# Patient Record
Sex: FEMALE | Race: WHITE | NOT HISPANIC OR LATINO | Employment: OTHER | URBAN - METROPOLITAN AREA
[De-identification: names, ages, dates, MRNs, and addresses within clinical notes are randomized per-mention and may not be internally consistent; named-entity substitution may affect disease eponyms.]

---

## 2021-03-27 ENCOUNTER — TRANSFERRED RECORDS (OUTPATIENT)
Dept: HEALTH INFORMATION MANAGEMENT | Facility: CLINIC | Age: 71
End: 2021-03-27

## 2021-03-28 ENCOUNTER — MEDICAL CORRESPONDENCE (OUTPATIENT)
Dept: HEALTH INFORMATION MANAGEMENT | Facility: CLINIC | Age: 71
End: 2021-03-28

## 2021-04-07 ENCOUNTER — TRANSFERRED RECORDS (OUTPATIENT)
Dept: HEALTH INFORMATION MANAGEMENT | Facility: CLINIC | Age: 71
End: 2021-04-07

## 2021-05-05 ENCOUNTER — RECORDS - HEALTHEAST (OUTPATIENT)
Dept: ADMINISTRATIVE | Facility: OTHER | Age: 71
End: 2021-05-05

## 2021-05-05 DIAGNOSIS — M26.643 ARTHRITIS OF BILATERAL TEMPOROMANDIBULAR JOINT: ICD-10-CM

## 2021-10-21 DIAGNOSIS — Z11.59 ENCOUNTER FOR SCREENING FOR OTHER VIRAL DISEASES: ICD-10-CM

## 2021-10-25 ENCOUNTER — TRANSFERRED RECORDS (OUTPATIENT)
Dept: MULTI SPECIALTY CLINIC | Facility: CLINIC | Age: 71
End: 2021-10-25

## 2021-10-25 LAB — POTASSIUM (EXTERNAL): 4.3 MMOL/L (ref 3.5–5)

## 2021-10-25 RX ORDER — AMLODIPINE BESYLATE 5 MG/1
5 TABLET ORAL DAILY
COMMUNITY

## 2021-10-25 RX ORDER — SOTALOL HYDROCHLORIDE 80 MG/1
80 TABLET ORAL 2 TIMES DAILY
COMMUNITY

## 2021-10-25 RX ORDER — DULOXETIN HYDROCHLORIDE 60 MG/1
60 CAPSULE, DELAYED RELEASE ORAL EVERY EVENING
COMMUNITY

## 2021-10-25 RX ORDER — DULOXETIN HYDROCHLORIDE 30 MG/1
30 CAPSULE, DELAYED RELEASE ORAL DAILY
COMMUNITY

## 2021-10-25 RX ORDER — LOSARTAN POTASSIUM 100 MG/1
100 TABLET ORAL DAILY
COMMUNITY

## 2021-10-25 RX ORDER — TRAMADOL HYDROCHLORIDE 50 MG/1
50-100 TABLET ORAL EVERY 4 HOURS PRN
Status: ON HOLD | COMMUNITY
End: 2021-11-05

## 2021-10-25 RX ORDER — NORTRIPTYLINE HCL 10 MG
10 CAPSULE ORAL AT BEDTIME
COMMUNITY

## 2021-10-25 RX ORDER — ATORVASTATIN CALCIUM 40 MG/1
40 TABLET, FILM COATED ORAL AT BEDTIME
COMMUNITY

## 2021-10-25 RX ORDER — ALBUTEROL SULFATE 90 UG/1
1-2 AEROSOL, METERED RESPIRATORY (INHALATION) EVERY 4 HOURS PRN
COMMUNITY

## 2021-10-25 ASSESSMENT — MIFFLIN-ST. JEOR: SCORE: 1449.82

## 2021-10-26 NOTE — PROVIDER NOTIFICATION
Discharge plan according to [facility]:       10/25/21 1432   Discharge Planning   Patient/Family Anticipates Transition to home   Living Arrangements   People in home alone   Type of Residence Private Residence   Is your private residence a single family home or apartment? Single family home   Bathroom Shower/Tub Tub/Shower unit   Support System   Support Systems Children

## 2021-11-03 ENCOUNTER — ANCILLARY PROCEDURE (OUTPATIENT)
Dept: ULTRASOUND IMAGING | Facility: CLINIC | Age: 71
End: 2021-11-03
Attending: ANESTHESIOLOGY
Payer: COMMERCIAL

## 2021-11-03 ENCOUNTER — HOSPITAL ENCOUNTER (OUTPATIENT)
Facility: CLINIC | Age: 71
Discharge: NURSING, REHAB OR LONG-TERM CARE | End: 2021-11-07
Attending: ORTHOPAEDIC SURGERY | Admitting: ORTHOPAEDIC SURGERY
Payer: COMMERCIAL

## 2021-11-03 ENCOUNTER — APPOINTMENT (OUTPATIENT)
Dept: RADIOLOGY | Facility: CLINIC | Age: 71
End: 2021-11-03
Attending: ORTHOPAEDIC SURGERY
Payer: COMMERCIAL

## 2021-11-03 ENCOUNTER — APPOINTMENT (OUTPATIENT)
Dept: RADIOLOGY | Facility: CLINIC | Age: 71
End: 2021-11-03
Attending: PHYSICIAN ASSISTANT
Payer: COMMERCIAL

## 2021-11-03 ENCOUNTER — ANESTHESIA (OUTPATIENT)
Dept: SURGERY | Facility: CLINIC | Age: 71
End: 2021-11-03
Payer: COMMERCIAL

## 2021-11-03 ENCOUNTER — ANESTHESIA EVENT (OUTPATIENT)
Dept: SURGERY | Facility: CLINIC | Age: 71
End: 2021-11-03
Payer: COMMERCIAL

## 2021-11-03 DIAGNOSIS — Z96.641 STATUS POST TOTAL REPLACEMENT OF RIGHT HIP: Primary | ICD-10-CM

## 2021-11-03 PROBLEM — Z96.649 S/P TOTAL HIP ARTHROPLASTY: Status: ACTIVE | Noted: 2021-11-03

## 2021-11-03 LAB
ABO/RH(D): NORMAL
ANTIBODY SCREEN: NEGATIVE
CREAT SERPL-MCNC: 0.74 MG/DL (ref 0.6–1.1)
GFR SERPL CREATININE-BSD FRML MDRD: 81 ML/MIN/1.73M2
GLUCOSE BLDC GLUCOMTR-MCNC: 151 MG/DL (ref 70–99)
GLUCOSE BLDC GLUCOMTR-MCNC: 190 MG/DL (ref 70–99)
GLUCOSE BLDC GLUCOMTR-MCNC: 207 MG/DL (ref 70–99)
HGB BLD-MCNC: 13.4 G/DL (ref 11.7–15.7)
INR PPP: 0.93 (ref 0.85–1.15)
SPECIMEN EXPIRATION DATE: NORMAL

## 2021-11-03 PROCEDURE — 999N000182 XR SURGERY CARM FLUORO GREATER THAN 5 MIN: Mod: TC

## 2021-11-03 PROCEDURE — 258N000001 HC RX 258: Performed by: ORTHOPAEDIC SURGERY

## 2021-11-03 PROCEDURE — 36415 COLL VENOUS BLD VENIPUNCTURE: CPT | Performed by: PHYSICIAN ASSISTANT

## 2021-11-03 PROCEDURE — 258N000003 HC RX IP 258 OP 636: Performed by: ANESTHESIOLOGY

## 2021-11-03 PROCEDURE — 250N000011 HC RX IP 250 OP 636: Performed by: ANESTHESIOLOGY

## 2021-11-03 PROCEDURE — 250N000011 HC RX IP 250 OP 636: Performed by: PHYSICIAN ASSISTANT

## 2021-11-03 PROCEDURE — 370N000017 HC ANESTHESIA TECHNICAL FEE, PER MIN: Performed by: ORTHOPAEDIC SURGERY

## 2021-11-03 PROCEDURE — 999N000065 XR PELVIS AND HIP RIGHT 2 VIEWS

## 2021-11-03 PROCEDURE — 250N000025 HC SEVOFLURANE, PER MIN: Performed by: ORTHOPAEDIC SURGERY

## 2021-11-03 PROCEDURE — 250N000009 HC RX 250: Performed by: NURSE ANESTHETIST, CERTIFIED REGISTERED

## 2021-11-03 PROCEDURE — 85610 PROTHROMBIN TIME: CPT | Performed by: PHYSICIAN ASSISTANT

## 2021-11-03 PROCEDURE — 250N000011 HC RX IP 250 OP 636: Performed by: NURSE ANESTHETIST, CERTIFIED REGISTERED

## 2021-11-03 PROCEDURE — 250N000013 HC RX MED GY IP 250 OP 250 PS 637: Mod: GY | Performed by: INTERNAL MEDICINE

## 2021-11-03 PROCEDURE — C1713 ANCHOR/SCREW BN/BN,TIS/BN: HCPCS | Performed by: ORTHOPAEDIC SURGERY

## 2021-11-03 PROCEDURE — 360N000084 HC SURGERY LEVEL 4 W/ FLUORO, PER MIN: Performed by: ORTHOPAEDIC SURGERY

## 2021-11-03 PROCEDURE — 250N000012 HC RX MED GY IP 250 OP 636 PS 637: Performed by: INTERNAL MEDICINE

## 2021-11-03 PROCEDURE — 82565 ASSAY OF CREATININE: CPT | Performed by: ORTHOPAEDIC SURGERY

## 2021-11-03 PROCEDURE — 250N000011 HC RX IP 250 OP 636: Performed by: ORTHOPAEDIC SURGERY

## 2021-11-03 PROCEDURE — 710N000010 HC RECOVERY PHASE 1, LEVEL 2, PER MIN: Performed by: ORTHOPAEDIC SURGERY

## 2021-11-03 PROCEDURE — 85018 HEMOGLOBIN: CPT | Performed by: PHYSICIAN ASSISTANT

## 2021-11-03 PROCEDURE — 250N000009 HC RX 250: Performed by: PHYSICIAN ASSISTANT

## 2021-11-03 PROCEDURE — 86900 BLOOD TYPING SEROLOGIC ABO: CPT | Performed by: PHYSICIAN ASSISTANT

## 2021-11-03 PROCEDURE — 250N000013 HC RX MED GY IP 250 OP 250 PS 637: Performed by: PHYSICIAN ASSISTANT

## 2021-11-03 PROCEDURE — C1776 JOINT DEVICE (IMPLANTABLE): HCPCS | Performed by: ORTHOPAEDIC SURGERY

## 2021-11-03 PROCEDURE — 73552 X-RAY EXAM OF FEMUR 2/>: CPT | Mod: RT

## 2021-11-03 PROCEDURE — 272N000001 HC OR GENERAL SUPPLY STERILE: Performed by: ORTHOPAEDIC SURGERY

## 2021-11-03 PROCEDURE — 258N000003 HC RX IP 258 OP 636: Performed by: ORTHOPAEDIC SURGERY

## 2021-11-03 PROCEDURE — 999N000141 HC STATISTIC PRE-PROCEDURE NURSING ASSESSMENT: Performed by: ORTHOPAEDIC SURGERY

## 2021-11-03 PROCEDURE — 82962 GLUCOSE BLOOD TEST: CPT | Mod: 91

## 2021-11-03 PROCEDURE — 96372 THER/PROPH/DIAG INJ SC/IM: CPT | Performed by: INTERNAL MEDICINE

## 2021-11-03 PROCEDURE — 99223 1ST HOSP IP/OBS HIGH 75: CPT | Performed by: INTERNAL MEDICINE

## 2021-11-03 PROCEDURE — 36415 COLL VENOUS BLD VENIPUNCTURE: CPT | Performed by: ORTHOPAEDIC SURGERY

## 2021-11-03 PROCEDURE — 258N000003 HC RX IP 258 OP 636: Performed by: PHYSICIAN ASSISTANT

## 2021-11-03 DEVICE — SHELL PINNACLE SECTOR W/ GRIPTION 52MM: Type: IMPLANTABLE DEVICE | Site: HIP | Status: FUNCTIONAL

## 2021-11-03 DEVICE — IMP CABLE SYN ORTHO COCR W/CRIMP 1.7X750MM 611.105.01S: Type: IMPLANTABLE DEVICE | Site: HIP | Status: FUNCTIONAL

## 2021-11-03 DEVICE — BIOLOX DELTA CERAMIC FEMORAL HEAD +8.5 36MM DIA 12/14 TAPER
Type: IMPLANTABLE DEVICE | Site: HIP | Status: FUNCTIONAL
Brand: BIOLOX DELTA

## 2021-11-03 DEVICE — APEX HOLE ELIMINATOR - PS
Type: IMPLANTABLE DEVICE | Site: HIP | Status: FUNCTIONAL
Brand: APEX

## 2021-11-03 DEVICE — PINNACLE HIP SOLUTIONS ALTRX POLYETHYLENE ACETABULAR LINER +4 NEUTRAL 36MM ID 52MM OD
Type: IMPLANTABLE DEVICE | Site: HIP | Status: FUNCTIONAL
Brand: PINNACLE ALTRX

## 2021-11-03 DEVICE — ACTIS DUOFIX HIP PROSTHESIS (FEMORAL STEM 12/14 TAPER CEMENTLESS SIZE 3 HIGH COLLAR)  CE
Type: IMPLANTABLE DEVICE | Site: HIP | Status: FUNCTIONAL
Brand: ACTIS

## 2021-11-03 RX ORDER — FENTANYL CITRATE 50 UG/ML
25 INJECTION, SOLUTION INTRAMUSCULAR; INTRAVENOUS EVERY 5 MIN PRN
Status: DISCONTINUED | OUTPATIENT
Start: 2021-11-03 | End: 2021-11-03 | Stop reason: HOSPADM

## 2021-11-03 RX ORDER — DULOXETIN HYDROCHLORIDE 30 MG/1
30 CAPSULE, DELAYED RELEASE ORAL DAILY
Status: DISCONTINUED | OUTPATIENT
Start: 2021-11-04 | End: 2021-11-07 | Stop reason: HOSPADM

## 2021-11-03 RX ORDER — LIDOCAINE HYDROCHLORIDE 10 MG/ML
INJECTION, SOLUTION INFILTRATION; PERINEURAL PRN
Status: DISCONTINUED | OUTPATIENT
Start: 2021-11-03 | End: 2021-11-03

## 2021-11-03 RX ORDER — NALOXONE HYDROCHLORIDE 0.4 MG/ML
0.2 INJECTION, SOLUTION INTRAMUSCULAR; INTRAVENOUS; SUBCUTANEOUS
Status: DISCONTINUED | OUTPATIENT
Start: 2021-11-03 | End: 2021-11-07 | Stop reason: HOSPADM

## 2021-11-03 RX ORDER — ACETAMINOPHEN 325 MG/1
650 TABLET ORAL EVERY 4 HOURS PRN
Qty: 100 TABLET | Refills: 0 | Status: SHIPPED | OUTPATIENT
Start: 2021-11-03 | End: 2021-11-05

## 2021-11-03 RX ORDER — ONDANSETRON 4 MG/1
4 TABLET, ORALLY DISINTEGRATING ORAL EVERY 30 MIN PRN
Status: DISCONTINUED | OUTPATIENT
Start: 2021-11-03 | End: 2021-11-03 | Stop reason: HOSPADM

## 2021-11-03 RX ORDER — PROCHLORPERAZINE MALEATE 5 MG
5 TABLET ORAL EVERY 6 HOURS PRN
Status: DISCONTINUED | OUTPATIENT
Start: 2021-11-03 | End: 2021-11-07 | Stop reason: HOSPADM

## 2021-11-03 RX ORDER — PROPOFOL 10 MG/ML
INJECTION, EMULSION INTRAVENOUS CONTINUOUS PRN
Status: DISCONTINUED | OUTPATIENT
Start: 2021-11-03 | End: 2021-11-03

## 2021-11-03 RX ORDER — HYDROMORPHONE HCL IN WATER/PF 6 MG/30 ML
0.2 PATIENT CONTROLLED ANALGESIA SYRINGE INTRAVENOUS
Status: DISCONTINUED | OUTPATIENT
Start: 2021-11-03 | End: 2021-11-07 | Stop reason: HOSPADM

## 2021-11-03 RX ORDER — CEFAZOLIN SODIUM 2 G/100ML
2 INJECTION, SOLUTION INTRAVENOUS SEE ADMIN INSTRUCTIONS
Status: DISCONTINUED | OUTPATIENT
Start: 2021-11-03 | End: 2021-11-03 | Stop reason: HOSPADM

## 2021-11-03 RX ORDER — ACETAMINOPHEN 325 MG/1
975 TABLET ORAL EVERY 8 HOURS
Status: COMPLETED | OUTPATIENT
Start: 2021-11-03 | End: 2021-11-06

## 2021-11-03 RX ORDER — LIDOCAINE 40 MG/G
CREAM TOPICAL
Status: DISCONTINUED | OUTPATIENT
Start: 2021-11-03 | End: 2021-11-07 | Stop reason: HOSPADM

## 2021-11-03 RX ORDER — MAGNESIUM HYDROXIDE/ALUMINUM HYDROXICE/SIMETHICONE 120; 1200; 1200 MG/30ML; MG/30ML; MG/30ML
30 SUSPENSION ORAL EVERY 4 HOURS PRN
Status: DISCONTINUED | OUTPATIENT
Start: 2021-11-03 | End: 2021-11-07 | Stop reason: HOSPADM

## 2021-11-03 RX ORDER — LOSARTAN POTASSIUM 50 MG/1
100 TABLET ORAL DAILY
Status: DISCONTINUED | OUTPATIENT
Start: 2021-11-04 | End: 2021-11-07 | Stop reason: HOSPADM

## 2021-11-03 RX ORDER — OXYCODONE HYDROCHLORIDE 5 MG/1
5 TABLET ORAL EVERY 4 HOURS PRN
Status: DISCONTINUED | OUTPATIENT
Start: 2021-11-03 | End: 2021-11-03 | Stop reason: HOSPADM

## 2021-11-03 RX ORDER — NALOXONE HYDROCHLORIDE 0.4 MG/ML
0.4 INJECTION, SOLUTION INTRAMUSCULAR; INTRAVENOUS; SUBCUTANEOUS
Status: DISCONTINUED | OUTPATIENT
Start: 2021-11-03 | End: 2021-11-07 | Stop reason: HOSPADM

## 2021-11-03 RX ORDER — DEXTROSE MONOHYDRATE 25 G/50ML
25-50 INJECTION, SOLUTION INTRAVENOUS
Status: DISCONTINUED | OUTPATIENT
Start: 2021-11-03 | End: 2021-11-07 | Stop reason: HOSPADM

## 2021-11-03 RX ORDER — SOTALOL HYDROCHLORIDE 80 MG/1
80 TABLET ORAL 2 TIMES DAILY
Status: DISCONTINUED | OUTPATIENT
Start: 2021-11-03 | End: 2021-11-07 | Stop reason: HOSPADM

## 2021-11-03 RX ORDER — ATORVASTATIN CALCIUM 40 MG/1
40 TABLET, FILM COATED ORAL AT BEDTIME
Status: DISCONTINUED | OUTPATIENT
Start: 2021-11-03 | End: 2021-11-07 | Stop reason: HOSPADM

## 2021-11-03 RX ORDER — CEFAZOLIN SODIUM 2 G/100ML
2 INJECTION, SOLUTION INTRAVENOUS EVERY 8 HOURS
Status: COMPLETED | OUTPATIENT
Start: 2021-11-03 | End: 2021-11-04

## 2021-11-03 RX ORDER — SODIUM CHLORIDE, SODIUM LACTATE, POTASSIUM CHLORIDE, CALCIUM CHLORIDE 600; 310; 30; 20 MG/100ML; MG/100ML; MG/100ML; MG/100ML
INJECTION, SOLUTION INTRAVENOUS CONTINUOUS
Status: DISCONTINUED | OUTPATIENT
Start: 2021-11-03 | End: 2021-11-03 | Stop reason: HOSPADM

## 2021-11-03 RX ORDER — ONDANSETRON 4 MG/1
4 TABLET, ORALLY DISINTEGRATING ORAL EVERY 6 HOURS PRN
Status: DISCONTINUED | OUTPATIENT
Start: 2021-11-03 | End: 2021-11-07 | Stop reason: HOSPADM

## 2021-11-03 RX ORDER — AMOXICILLIN 250 MG
1 CAPSULE ORAL 2 TIMES DAILY
Qty: 40 TABLET | Refills: 0 | Status: SHIPPED | OUTPATIENT
Start: 2021-11-03 | End: 2021-11-05

## 2021-11-03 RX ORDER — HYDROMORPHONE HCL IN WATER/PF 6 MG/30 ML
0.4 PATIENT CONTROLLED ANALGESIA SYRINGE INTRAVENOUS
Status: DISCONTINUED | OUTPATIENT
Start: 2021-11-03 | End: 2021-11-07 | Stop reason: HOSPADM

## 2021-11-03 RX ORDER — GABAPENTIN 300 MG/1
300 CAPSULE ORAL AT BEDTIME
Status: DISCONTINUED | OUTPATIENT
Start: 2021-11-03 | End: 2021-11-07 | Stop reason: HOSPADM

## 2021-11-03 RX ORDER — FENTANYL CITRATE 50 UG/ML
INJECTION, SOLUTION INTRAMUSCULAR; INTRAVENOUS PRN
Status: DISCONTINUED | OUTPATIENT
Start: 2021-11-03 | End: 2021-11-03

## 2021-11-03 RX ORDER — FENTANYL CITRATE 50 UG/ML
100 INJECTION, SOLUTION INTRAMUSCULAR; INTRAVENOUS ONCE
Status: DISCONTINUED | OUTPATIENT
Start: 2021-11-03 | End: 2021-11-03 | Stop reason: HOSPADM

## 2021-11-03 RX ORDER — FENTANYL CITRATE 50 UG/ML
50 INJECTION, SOLUTION INTRAMUSCULAR; INTRAVENOUS
Status: DISCONTINUED | OUTPATIENT
Start: 2021-11-03 | End: 2021-11-03 | Stop reason: HOSPADM

## 2021-11-03 RX ORDER — METOPROLOL TARTRATE 1 MG/ML
5 INJECTION, SOLUTION INTRAVENOUS EVERY 6 HOURS
Status: DISCONTINUED | OUTPATIENT
Start: 2021-11-04 | End: 2021-11-05

## 2021-11-03 RX ORDER — GABAPENTIN 300 MG/1
300 CAPSULE ORAL AT BEDTIME
Qty: 30 CAPSULE | Refills: 0 | Status: SHIPPED | OUTPATIENT
Start: 2021-11-03 | End: 2021-11-05

## 2021-11-03 RX ORDER — DEXAMETHASONE SODIUM PHOSPHATE 4 MG/ML
INJECTION, SOLUTION INTRA-ARTICULAR; INTRALESIONAL; INTRAMUSCULAR; INTRAVENOUS; SOFT TISSUE PRN
Status: DISCONTINUED | OUTPATIENT
Start: 2021-11-03 | End: 2021-11-03

## 2021-11-03 RX ORDER — ONDANSETRON 2 MG/ML
4 INJECTION INTRAMUSCULAR; INTRAVENOUS EVERY 30 MIN PRN
Status: DISCONTINUED | OUTPATIENT
Start: 2021-11-03 | End: 2021-11-03 | Stop reason: HOSPADM

## 2021-11-03 RX ORDER — ALBUTEROL SULFATE 90 UG/1
1-2 AEROSOL, METERED RESPIRATORY (INHALATION) EVERY 4 HOURS PRN
Status: DISCONTINUED | OUTPATIENT
Start: 2021-11-03 | End: 2021-11-07 | Stop reason: HOSPADM

## 2021-11-03 RX ORDER — NICOTINE POLACRILEX 4 MG
15-30 LOZENGE BUCCAL
Status: DISCONTINUED | OUTPATIENT
Start: 2021-11-03 | End: 2021-11-07 | Stop reason: HOSPADM

## 2021-11-03 RX ORDER — TRANEXAMIC ACID 650 MG/1
1950 TABLET ORAL ONCE
Status: COMPLETED | OUTPATIENT
Start: 2021-11-03 | End: 2021-11-03

## 2021-11-03 RX ORDER — AMLODIPINE BESYLATE 5 MG/1
5 TABLET ORAL DAILY
Status: DISCONTINUED | OUTPATIENT
Start: 2021-11-04 | End: 2021-11-07 | Stop reason: HOSPADM

## 2021-11-03 RX ORDER — LIDOCAINE 40 MG/G
CREAM TOPICAL
Status: DISCONTINUED | OUTPATIENT
Start: 2021-11-03 | End: 2021-11-03 | Stop reason: HOSPADM

## 2021-11-03 RX ORDER — SODIUM CHLORIDE, SODIUM LACTATE, POTASSIUM CHLORIDE, CALCIUM CHLORIDE 600; 310; 30; 20 MG/100ML; MG/100ML; MG/100ML; MG/100ML
INJECTION, SOLUTION INTRAVENOUS CONTINUOUS
Status: DISCONTINUED | OUTPATIENT
Start: 2021-11-03 | End: 2021-11-07 | Stop reason: HOSPADM

## 2021-11-03 RX ORDER — PROPOFOL 10 MG/ML
INJECTION, EMULSION INTRAVENOUS PRN
Status: DISCONTINUED | OUTPATIENT
Start: 2021-11-03 | End: 2021-11-03

## 2021-11-03 RX ORDER — NORTRIPTYLINE HCL 10 MG
10 CAPSULE ORAL AT BEDTIME
Status: DISCONTINUED | OUTPATIENT
Start: 2021-11-03 | End: 2021-11-07 | Stop reason: HOSPADM

## 2021-11-03 RX ORDER — ACETAMINOPHEN 325 MG/1
650 TABLET ORAL EVERY 4 HOURS PRN
Status: DISCONTINUED | OUTPATIENT
Start: 2021-11-06 | End: 2021-11-07 | Stop reason: HOSPADM

## 2021-11-03 RX ORDER — OXYCODONE HYDROCHLORIDE 5 MG/1
10 TABLET ORAL EVERY 4 HOURS PRN
Status: DISCONTINUED | OUTPATIENT
Start: 2021-11-03 | End: 2021-11-07 | Stop reason: HOSPADM

## 2021-11-03 RX ORDER — AMOXICILLIN 250 MG
1 CAPSULE ORAL 2 TIMES DAILY
Status: DISCONTINUED | OUTPATIENT
Start: 2021-11-03 | End: 2021-11-07 | Stop reason: HOSPADM

## 2021-11-03 RX ORDER — ONDANSETRON 2 MG/ML
INJECTION INTRAMUSCULAR; INTRAVENOUS PRN
Status: DISCONTINUED | OUTPATIENT
Start: 2021-11-03 | End: 2021-11-03

## 2021-11-03 RX ORDER — ACETAMINOPHEN 325 MG/1
975 TABLET ORAL ONCE
Status: COMPLETED | OUTPATIENT
Start: 2021-11-03 | End: 2021-11-03

## 2021-11-03 RX ORDER — DULOXETIN HYDROCHLORIDE 60 MG/1
60 CAPSULE, DELAYED RELEASE ORAL EVERY EVENING
Status: DISCONTINUED | OUTPATIENT
Start: 2021-11-03 | End: 2021-11-07 | Stop reason: HOSPADM

## 2021-11-03 RX ORDER — CEFAZOLIN SODIUM 2 G/100ML
2 INJECTION, SOLUTION INTRAVENOUS
Status: DISCONTINUED | OUTPATIENT
Start: 2021-11-03 | End: 2021-11-03 | Stop reason: HOSPADM

## 2021-11-03 RX ORDER — HYDROMORPHONE HCL IN WATER/PF 6 MG/30 ML
0.2 PATIENT CONTROLLED ANALGESIA SYRINGE INTRAVENOUS EVERY 5 MIN PRN
Status: DISCONTINUED | OUTPATIENT
Start: 2021-11-03 | End: 2021-11-03 | Stop reason: HOSPADM

## 2021-11-03 RX ORDER — CHOLECALCIFEROL (VITAMIN D3) 50 MCG
1 TABLET ORAL DAILY
COMMUNITY

## 2021-11-03 RX ORDER — BISACODYL 10 MG
10 SUPPOSITORY, RECTAL RECTAL DAILY PRN
Status: DISCONTINUED | OUTPATIENT
Start: 2021-11-03 | End: 2021-11-07 | Stop reason: HOSPADM

## 2021-11-03 RX ORDER — ONDANSETRON 2 MG/ML
4 INJECTION INTRAMUSCULAR; INTRAVENOUS EVERY 6 HOURS PRN
Status: DISCONTINUED | OUTPATIENT
Start: 2021-11-03 | End: 2021-11-07 | Stop reason: HOSPADM

## 2021-11-03 RX ORDER — HYDROXYZINE HYDROCHLORIDE 10 MG/1
10 TABLET, FILM COATED ORAL EVERY 6 HOURS PRN
Qty: 30 TABLET | Refills: 0 | Status: SHIPPED | OUTPATIENT
Start: 2021-11-03 | End: 2021-11-05

## 2021-11-03 RX ORDER — POLYETHYLENE GLYCOL 3350 17 G/17G
17 POWDER, FOR SOLUTION ORAL DAILY
Status: DISCONTINUED | OUTPATIENT
Start: 2021-11-04 | End: 2021-11-07 | Stop reason: HOSPADM

## 2021-11-03 RX ORDER — OXYCODONE HYDROCHLORIDE 5 MG/1
5-10 TABLET ORAL EVERY 4 HOURS PRN
Qty: 35 TABLET | Refills: 0 | Status: SHIPPED | OUTPATIENT
Start: 2021-11-03 | End: 2021-11-05

## 2021-11-03 RX ADMIN — FENTANYL CITRATE 100 MCG: 50 INJECTION, SOLUTION INTRAMUSCULAR; INTRAVENOUS at 14:51

## 2021-11-03 RX ADMIN — FENTANYL CITRATE 100 MCG: 50 INJECTION, SOLUTION INTRAMUSCULAR; INTRAVENOUS at 13:28

## 2021-11-03 RX ADMIN — INSULIN ASPART 1 UNITS: 100 INJECTION, SOLUTION INTRAVENOUS; SUBCUTANEOUS at 20:15

## 2021-11-03 RX ADMIN — PROPOFOL 100 MCG/KG/MIN: 10 INJECTION, EMULSION INTRAVENOUS at 13:35

## 2021-11-03 RX ADMIN — ACETAMINOPHEN 975 MG: 325 TABLET ORAL at 22:05

## 2021-11-03 RX ADMIN — CEFAZOLIN SODIUM 2 G: 2 INJECTION, SOLUTION INTRAVENOUS at 13:43

## 2021-11-03 RX ADMIN — TRANEXAMIC ACID 1950 MG: 650 TABLET ORAL at 12:09

## 2021-11-03 RX ADMIN — HYDROMORPHONE HYDROCHLORIDE 0.5 MG: 1 INJECTION, SOLUTION INTRAMUSCULAR; INTRAVENOUS; SUBCUTANEOUS at 14:11

## 2021-11-03 RX ADMIN — CEFAZOLIN SODIUM 2 G: 2 INJECTION, SOLUTION INTRAVENOUS at 22:06

## 2021-11-03 RX ADMIN — NORTRIPTYLINE HYDROCHLORIDE 10 MG: 10 CAPSULE ORAL at 20:17

## 2021-11-03 RX ADMIN — PROPOFOL 150 MG: 10 INJECTION, EMULSION INTRAVENOUS at 13:33

## 2021-11-03 RX ADMIN — FENTANYL CITRATE 50 MCG: 50 INJECTION, SOLUTION INTRAMUSCULAR; INTRAVENOUS at 15:46

## 2021-11-03 RX ADMIN — DEXAMETHASONE SODIUM PHOSPHATE 10 MG: 4 INJECTION, SOLUTION INTRA-ARTICULAR; INTRALESIONAL; INTRAMUSCULAR; INTRAVENOUS; SOFT TISSUE at 13:33

## 2021-11-03 RX ADMIN — ACETAMINOPHEN 975 MG: 325 TABLET ORAL at 12:09

## 2021-11-03 RX ADMIN — ROCURONIUM BROMIDE 50 MG: 10 INJECTION, SOLUTION INTRAVENOUS at 13:33

## 2021-11-03 RX ADMIN — FENTANYL CITRATE 25 MCG: 50 INJECTION, SOLUTION INTRAMUSCULAR; INTRAVENOUS at 16:38

## 2021-11-03 RX ADMIN — ONDANSETRON 4 MG: 2 INJECTION INTRAMUSCULAR; INTRAVENOUS at 13:33

## 2021-11-03 RX ADMIN — SODIUM CHLORIDE, POTASSIUM CHLORIDE, SODIUM LACTATE AND CALCIUM CHLORIDE: 600; 310; 30; 20 INJECTION, SOLUTION INTRAVENOUS at 13:27

## 2021-11-03 RX ADMIN — HYDROMORPHONE HYDROCHLORIDE 0.5 MG: 1 INJECTION, SOLUTION INTRAMUSCULAR; INTRAVENOUS; SUBCUTANEOUS at 14:04

## 2021-11-03 RX ADMIN — DULOXETINE HYDROCHLORIDE 60 MG: 30 CAPSULE, DELAYED RELEASE ORAL at 20:16

## 2021-11-03 RX ADMIN — LIDOCAINE HYDROCHLORIDE 50 MG: 10 INJECTION, SOLUTION INFILTRATION; PERINEURAL at 13:33

## 2021-11-03 RX ADMIN — MIDAZOLAM 2 MG: 1 INJECTION INTRAMUSCULAR; INTRAVENOUS at 13:28

## 2021-11-03 RX ADMIN — ATORVASTATIN CALCIUM 40 MG: 40 TABLET, FILM COATED ORAL at 20:18

## 2021-11-03 RX ADMIN — SODIUM CHLORIDE, POTASSIUM CHLORIDE, SODIUM LACTATE AND CALCIUM CHLORIDE: 600; 310; 30; 20 INJECTION, SOLUTION INTRAVENOUS at 12:27

## 2021-11-03 RX ADMIN — GABAPENTIN 300 MG: 300 CAPSULE ORAL at 20:18

## 2021-11-03 RX ADMIN — DOCUSATE SODIUM 50MG AND SENNOSIDES 8.6MG 1 TABLET: 8.6; 5 TABLET, FILM COATED ORAL at 20:16

## 2021-11-03 RX ADMIN — SODIUM CHLORIDE, POTASSIUM CHLORIDE, SODIUM LACTATE AND CALCIUM CHLORIDE: 600; 310; 30; 20 INJECTION, SOLUTION INTRAVENOUS at 20:13

## 2021-11-03 RX ADMIN — FENTANYL CITRATE 25 MCG: 50 INJECTION, SOLUTION INTRAMUSCULAR; INTRAVENOUS at 16:23

## 2021-11-03 RX ADMIN — FENTANYL CITRATE 25 MCG: 50 INJECTION, SOLUTION INTRAMUSCULAR; INTRAVENOUS at 16:17

## 2021-11-03 RX ADMIN — FENTANYL CITRATE 50 MCG: 50 INJECTION, SOLUTION INTRAMUSCULAR; INTRAVENOUS at 15:59

## 2021-11-03 RX ADMIN — SOTALOL HYDROCHLORIDE 80 MG: 80 TABLET ORAL at 20:17

## 2021-11-03 RX ADMIN — METFORMIN HYDROCHLORIDE 1000 MG: 500 TABLET, FILM COATED ORAL at 20:17

## 2021-11-03 ASSESSMENT — MIFFLIN-ST. JEOR: SCORE: 1430.77

## 2021-11-03 ASSESSMENT — COPD QUESTIONNAIRES
COPD: 1
CAT_SEVERITY: MILD

## 2021-11-03 NOTE — OP NOTE
PATIENT:  Preeti Cooper    DATE OF SURGERY:  11/3/2021     PREOPERATIVE DIAGNOSIS: Right hip osteoarthritis.     POSTOPERATIVE DIAGNOSIS: Right hip osteoarthritis.     PROCEDURE: Right total hip arthroplasty.     SURGEON: AUDRA PRUITT MD.     ASSISTANT: Yandy Grijalva PA-C; MICH assist was essential and required for all portions of the case, including patient positioning, soft tissue retraction, patient safety, use of complex orthopedic instrumentation, assistance with closure of the wound, and postoperative care    ANESTHESIA: General    EBL: 250 mL    COMPLICATIONS: None    IMPLANTS:   1. DePuy Actis femoral stem size 3 High offset, 12/14 trunnion.   2. DePuy Gription acetabular shell, 52 mm outer diameter.   3. DePuy AltrX polyethylene liner, +4 offset.   4. DePuy Biolox ceramic femoral head, 36 mm outer diameter, 8.5 mm neck length.     INDICATION FOR PROCEDURE: Preeti Cooper is a pleasant 71 year old female with long standing Hip degenerative joint disease.  It failed to respond to conservative management.  The above procedure was recommended.  For full details please see my clinic notes.  The risks including, but not limited to, bleeding, infection, nerve injury, dvt, implant failure, implant wear, fracture, limb length inequality, anesthetic complications, heart attack, stroke, and even death were discussed.  Pt verbalized understanding.  Informed consent was obtained      DESCRIPTION OF PROCEDURE: The patient was seen and evaluated in the preop area. Discussion of the surgery and any further questions were answered with the patient and family using easily understandable non-medical terms. The correct site was identified with the patient, and I placed my initials at the surgical site. Pre-procedure verification was completed. Relevant information / documentation available, they were reviewed and properly matched to the patient.  I verified the consent was accurate and complete.  I verified that the proper  surgical equipment and supplies were available. The patient was taken to the operating room and placed in position according to the procedure in consideration with all extremities well padded.  The patient received preoperative prophylactic antibiotics based on the patient s procedure, BMI, and allergy profile.  A Time Out was conducted just prior to starting procedure to verify the eight required elements: 1-patient identity, 2-consent accurate and complete, 3-position, 4-correct side/site marked (if applicable), 5-procedure, 6-relevant images / results properly labeled and displayed (if applicable), 7-antibiotics / irrigation fluids (if applicable), 8-safety precautions.    The patient was  given successful general anesthesia. The patient was then placed in the supine position on the [place] table with all extremities well padded.  The operative  Hip was then prepped and draped in sterile fashion.  The leg was covered with a Ioban type drape.     I made a longitudinal incision over the tensor fascia muscle.  It was located approx 1 cm distal and 3 cm lateral to the anterior superior iliac spine.  It was angled slightly posterior  And lateral towards the lateral femoral condyle.  The incision was approx 8cm long and parallels the fibers of the tensor fascia mary muscle.  The subcutanuos tissues were incised with a scalpel.  Once at the tensor fascia it was split longitudinally.  An Dinorah type clamp was placed on the anterior aspect. The vessel that perforates the fascia was coagulated with electrocautery.  Blunt finger dissection was performed anteriorly and medially to the tensor fascia muscle.  A Zee retractor was placed anteriorly and blunt Cobra retractors were on the superior lateral and inferior medial femoral neck.  A Sheffield elevator was then used to elevate the ilipsoas muscle and rectus femorus muscles off the anterior hip capsule.  I then identified the lateral femoral circumflex vessels which were then tied  "off and/or coagulated.  I then performed a \"T\" capsulotomy of the hip capsule, which extended down to the intertrochanteric line.  Each leaf was tagged for later repair.  I then placed the Cobra retractors inside of the hip capsule and a right angle retractor was carefully placed anteriorly to the hip joint, just under the rectus femorus muscle.      We then obtained intraoperative xrays of the pelvis and hips.  The hip was placed under gentle traction and externally rotated 20 degrees. Blunt retractors were placed around the femoral neck to protect the surrounding structures. I then made the femoral neck cut with an oscillating saw as per my preoperative templating and using C-arm verification.  The femoral head was then removed without complication. Traction was then released. Retractors were then placed around the acetabulum.  The labrum was sharply excised.  The capsule was released off the medial and posterior medial neck.      We then began our reaming of the acetabulum by aiming the reamer anterior to posterior and proximal.  We medialized under C-arm visualization.  We sequentially reamed in 1-2mm increments.  Progress was checked by visualizing the acetabulum, direct palpation of the acetabulum and C-arm fluoroscopy.  We reamed until there was good circumferential fit on C-arm pictures and our approximate templated size.  Careful attentian was paid to make sure we had a true AP pelvis xray.  After we had reamed to the appropriate size I inserted the acetabular component.  It was impacted into position under C-arm fluoroscopic guidance.  It was placed at 45 degrees of inclination and 20 degrees of anteversion.  It had excellent fit and stability.  No additional screw fixation was felt to be needed.      We then turned our attention to femoral preparation.  The bone hook was placed around the posterior femur just distal to the vastus tubercle.  The femur was then gently externally rotated and releases were " performed.  The capsule about the greater trochanter was excised to aid in delivering the femur.  The leg was then progressively externally rotated to approximately 120 degrees.   The hip was then adducted and extended using the Bryant table.  Once good visualization of the femoral neck was achieved, blunt retractors were placed around the femoral neck.  A rongeur was used to remove excess bone from the lateral neck remnant.  I then began femoral broaching.  I started with the smallest size.  The broach was oriented such that it paralleled the posterior cortex of the femoral neck.  C-arm pictures were taken periodically to make sure the broaches were going down the femur appropriately and not in varus.  Broaching continued until complete stability was achieved throughout the proximal femur.  The appropriate trial neck and head were placed on the broach.  The leg ws brought back up to neutral position, then utilizing the Bryant table, the hip reduced.      The leg length, offset, component size and position was then checked with the C-arm xray.  The pictures were also printed off to compare pre and post position and compare to non-operative side, using an overlay technique.  Once the appropriate neck length and offset were achieved, the hip was then dislocated.   The leg was externally rotated, adducted and extended.  The femoral trials were removed.  The acetabular liner trial was removed.  The appproriate highly cross linked polyethylene liner was then impacted appropriately into the acetabular component.  The appropriate sized femoral component was then impacted into the proximal femur without complication. I felt there was the possibilty of a small crack starting at the neck, so out of an abundance of caution I applieed one synthes 1.7mm cable around the calcar.  The appropriate final head was then impacted onto the stem.  The hip was then reduced utilizing the Bryant table.  C-arm pictures were taken again to confirm  appropriate leg length, offset, component size and postition.  I externally the hip to 90 degrees and internally rotated to 90 degrees without any instability.  A shuck test was performed without any instability.  The soft tissue tension appeared appropriate.      The hip was then irrigated with antibiotic saline.  The hip capsule was closed with #1 fiberwire sutures. The fascia was closed with #1 vicryl suture.  Subcutaneous layer with 2-O vicryl suture.  Skin with 3-O monocryl suture and Dermabond.  A sterile dressing was placed on the surgical hip.  The sponge and needle counts were correct at the end of the case.  The patient left the operating room in stable condition.      POST OP PLAN:   1) Pain Control:  IV narcotics, transition to oral narcotics as bowel function returns, Toradol, Decadron, Gabapentin, Ice.  2) DVT Prophylaxis: Restart home dose eliquis tomorrow, mechanical devices, early ambulation  3) Infection Prophylaxis: IV Abx as per pt's allergy profile and BMI x 24 hrs  4) PT/OT: Eval and Treat as per ANTERIOR APPROACH protocol  5) Medical Cares: Primary medical consult  6) Dispostion:  consult as needed for disposition    Implant Name Type Inv. Item Serial No.  Lot No. LRB No. Used Action   LINER ACETABULAR ALTRX +4 NEUT [id number] - [id number] Total Joint Component/Insert LINER ACETABULAR ALTRX +4 NEUT [id number]  [employer]- [id number] Right 1 Implanted   ACETABULAR SHELL    Depuy [id number] Right 1 Implanted   IMP APEX HOLE ELIMINATOR HIP DEPUY DURALOC 1246- - [id number] Metallic Hardware/Dugger IMP APEX HOLE ELIMINATOR HIP DEPUY DURALOC 1246-  [employer]- [id number] Right 1 Implanted   IMP STEM FEM DEPUY ACTIS COLLAR HI-OFFSET SZ 3MM 1010- - [id number] Total Joint Component/Insert IMP STEM FEM DEPUY ACTIS COLLAR HI-OFFSET SZ 3MM 1010-  [employer]- [id number] Right 1 Implanted   IMP CABLE SYN ORTHO COCR W/CRIMP [id number] 611.105.01S -  [id number] Metallic Hardware/[place] IMP CABLE SYN ORTHO COCR W/CRIMP [id number] [id number]  [facility] [id number] Right 3 Implanted   IMP HEAD FEMORAL DEPUY CERAMIC 36MM +8MM 9876- - [id number] Total Joint Component/Insert IMP HEAD FEMORAL DEPUY CERAMIC 36MM +8MM 1369-  [employer] [facility]- [id number] Right 1 Implanted         Neftali Brown MD  11/3/2021  3:28 PM      @C(1)@  Neftali Brown MD    @C(2)@  Bella Jo

## 2021-11-03 NOTE — TREATMENT PLAN
Orthopedic Surgery Pre-Op Plan: Preeti Cooper  pre-op review. This is NOT an H&P   Surgeon: Dr. Brown   [facility]: [facility]  Name of Surgery: Right Direct Anterior Total Hip Arthroplasty   Date of Surgery: 11/3/21   H&P: Completed 10/25/21 by Dr. Emelina Trammell at [facility]  History of ASA, NSAIDS, vitamin and/or herbal supplements within 10 days: No  History of blood thinners: Yes- on apixaban (Eliquis) for Atrial Fibrillation. Instructed by PCP to hold Eliquis for 2 days before surgery (last dose evening of 10/31/21).     Plan:   1) Discharge Plan: Home with assist of Children. Please see Discharge Planning section near bottom of this note for further details.     2) Paroxysmal Atrial Fibrillation/Atrial Flutter: on rhythm control with sotalol and chronic anticoagulation with Eliquis for stroke prevention. Follows with Cardiology and \[facility]. PCP instructed patient to hold Eliquis for 2 days before surgery (last dose evening of 10/31/21). Will plan to resume Eliquis after surgery as soon as safe per Dr. Brown.     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     4) Sinus Node Dysfunction- S/P Dual Chamber Pacemaker Placement (Medtronic) 8/23/21: Recent device check on 10/1/2021 showed stable battery and lead status.  No high ventricular rate episodes.    5) Hypertension: Appears well controlled on amlodipine and losartan.  Instructed to hold losartan on the morning of surgery but should continue taking amlodipine.    6) Hyperlipidemia: On atorvastatin.    7) COPD: Denies any recent exacerbations.  On Brio Ellipta and albuterol inhalers.    8) Obstructive Sleep Apnea: On CPAP.  Patient was reminded to bring CPAP machine with her to hospital.    9) Type 2 Diabetes Mellitus: Well controlled.  Most recent hemoglobin A1c 6.2 on  7/26/2021.  On Metformin. I recommend blood glucose checks at least three times a day and at bedtime during hospital stay. Goal BG < 200 to decrease risk for infection and wound healing complications. Nursing to please notify Hospitalist if BG > 200.      10) History of TIA: in 2012.  No recurrence or residual symptoms per PCP.    11) Depression and Anxiety: On duloxetine.    Patient appears medically optimized for upcoming surgery. I would recommend Hospitalist Consult to assist with medical management. Please call me below with any questions on this patient.       Review of Systems Notable for: Paroxysmal atrial fibrillation/atrial flutter, SVT-s/p ablation 7/29/2021, sinus node dysfunction-s/p dual-chamber pacemaker placement 8/23/2021, hypertension, hyperlipidemia, COPD, obstructive sleep apnea-on CPAP, type 2 diabetes mellitus, history of TIA-2012, depression, anxiety.    Past Medical History:   Past Medical History:   Diagnosis Date     Antiplatelet or antithrombotic long-term use      Arrhythmia      Arthritis      COPD (chronic obstructive pulmonary disease) (H)      Coronary artery disease      Diabetes (H)      Hypertension      Irregular heart beat      Obese      Other chronic pain      Pacemaker      Sleep apnea      TIA (transient ischemic attack) 2005     Past Surgical History:   Procedure Laterality Date     ATRIAL CARDIAC PACEMAKER INSERTION  2021     CARDIAC SURGERY  2021    ablation     ORTHOPEDIC SURGERY      LANIE       Current Medications:  Patient's Medications   New Prescriptions    No medications on file   Previous Medications    ALBUTEROL (PROAIR HFA/PROVENTIL HFA/VENTOLIN HFA) 108 (90 BASE) MCG/ACT INHALER    Inhale 2 puffs into the lungs every 6 hours    AMLODIPINE (NORVASC) 5 MG TABLET    Take 5 mg by mouth daily    APIXABAN ANTICOAGULANT (ELIQUIS) 5 MG TABLET    Take 5 mg by mouth 2 times daily    ATORVASTATIN (LIPITOR) 40 MG TABLET    Take 40 mg by mouth daily    DULOXETINE (CYMBALTA) 30  MG CAPSULE    Take 30 mg by mouth daily 30MG IN THE MORNING AND 60MG IN THE EVENING    DULOXETINE (CYMBALTA) 60 MG CAPSULE    Take 60 mg by mouth every evening 30MG IN THE MORNING AND 60MG IN THE EVENING    FLUTICASONE-VILANTEROL (BREO ELLIPTA) 100-25 MCG/INH INHALER    Inhale 1 puff into the lungs daily    LOSARTAN (COZAAR) 100 MG TABLET    Take 100 mg by mouth daily    METFORMIN (GLUCOPHAGE) 500 MG TABLET    Take 1,000 mg by mouth 2 times daily (with meals)     NORTRIPTYLINE (PAMELOR) 10 MG CAPSULE    Take 10 mg by mouth At Bedtime    SOTALOL (BETAPACE) 80 MG TABLET    Take 80 mg by mouth 2 times daily    TRAMADOL (ULTRAM) 50 MG TABLET    Take 50 mg by mouth every 6 hours as needed for severe pain   Modified Medications    No medications on file   Discontinued Medications    No medications on file       ALLERGIES:  Allergies   Allergen Reactions     Atenolol      Sulfa Drugs      Lamotrigine Rash       Social History  Social History     Tobacco Use     Smoking status: Never Smoker     Smokeless tobacco: Never Used   Substance Use Topics     Alcohol use: Not Currently     Comment: rare     Drug use: Never       Any Abnormal Recent Diagnostics? No      Discharge Planning:   Discharge plan according to [facility]:       10/25/21 1432   Discharge Planning   Patient/Family Anticipates Transition to home   Living Arrangements   People in home alone   Type of Residence Private Residence   Is your private residence a single family home or apartment? Single family home   Bathroom Shower/Tub Tub/Shower unit   Support System   Support Systems Children      VIC Desai, CNP   Advanced Practice Nurse Navigator- Orthopedics  [facility]   Phone: [phone]

## 2021-11-03 NOTE — ANESTHESIA CARE TRANSFER NOTE
Patient: Preeit Cooper    Procedure: Procedure(s):  RIGHT DIRECT ANTERIOR TOTAL HIP ARTHROPLASTY       Diagnosis: Right hip pain [M25.551]  Diagnosis Additional Information: No value filed.    Anesthesia Type:   General     Note:    Oropharynx: oropharynx clear of all foreign objects  Level of Consciousness: drowsy  Oxygen Supplementation: face mask  Level of Supplemental Oxygen (L/min / FiO2): 6  Independent Airway: airway patency satisfactory and stable  Dentition: dentition unchanged  Vital Signs Stable: post-procedure vital signs reviewed and stable  Report to RN Given: handoff report given  Patient transferred to: PACU    Handoff Report: Identifed the Patient, Identified the Reponsible Provider, Reviewed the pertinent medical history, Discussed the surgical course, Reviewed Intra-OP anesthesia mangement and issues during anesthesia, Set expectations for post-procedure period and Allowed opportunity for questions and acknowledgement of understanding      Vitals:  Vitals Value Taken Time   /78 11/03/21 1557   Temp 37.7  C (99.9  F) 11/03/21 1555   Pulse 74 11/03/21 1558   Resp 22 11/03/21 1558   SpO2 100 % 11/03/21 1558   Vitals shown include unvalidated device data.    Electronically Signed By: VIC Gonzalez CRNA  November 3, 2021  3:59 PM

## 2021-11-03 NOTE — ANESTHESIA POSTPROCEDURE EVALUATION
Patient: Preeti Cooper    Procedure: Procedure(s):  RIGHT DIRECT ANTERIOR TOTAL HIP ARTHROPLASTY       Diagnosis:Right hip pain [M25.551]  Diagnosis Additional Information: No value filed.    Anesthesia Type:  General    Note:  Disposition: Inpatient   Postop Pain Control: Uneventful            Sign Out: Well controlled pain   PONV: No   Neuro/Psych: Uneventful            Sign Out: Acceptable/Baseline neuro status   Airway/Respiratory: Uneventful            Sign Out: Acceptable/Baseline resp. status   CV/Hemodynamics: Uneventful            Sign Out: Acceptable CV status; No obvious hypovolemia; No obvious fluid overload   Other NRE: NONE   DID A NON-ROUTINE EVENT OCCUR? No           Last vitals:  Vitals Value Taken Time   /79 11/03/21 1730   Temp 37.6  C (99.6  F) 11/03/21 1630   Pulse 71 11/03/21 1733   Resp 16 11/03/21 1733   SpO2 99 % 11/03/21 1733   Vitals shown include unvalidated device data.    Electronically Signed By: Chhaya Gaviria MD  November 3, 2021  5:34 PM

## 2021-11-03 NOTE — PHARMACY-ADMISSION MEDICATION HISTORY
Pharmacist completed medication history with the patient while in the PACO.  Prior to admission (PTA) med list completed and updated in the electronic medical record (EMR).  Pharmacy Note - Admission Medication History  Pertinent Provider Information: none   ______________________________________________________________________  Prior To Admission (PTA) med list completed and updated in EMR.     Medications Prior to Admission   Medication Sig Dispense Refill Last Dose     albuterol (PROAIR HFA/PROVENTIL HFA/VENTOLIN HFA) 108 (90 Base) MCG/ACT inhaler Inhale 1-2 puffs into the lungs every 4 hours as needed    More than a month at Unknown time     amLODIPine (NORVASC) 5 MG tablet Take 5 mg by mouth daily   11/3/2021 at Unknown time     apixaban ANTICOAGULANT (ELIQUIS) 5 MG tablet Take 5 mg by mouth 2 times daily   10/31/2021 at 2200     atorvastatin (LIPITOR) 40 MG tablet Take 40 mg by mouth At Bedtime    11/2/2021 at Unknown time     DULoxetine (CYMBALTA) 30 MG capsule Take 30 mg by mouth daily 30MG IN THE MORNING AND 60MG IN THE EVENING   11/2/2021 at Unknown time     DULoxetine (CYMBALTA) 60 MG capsule Take 60 mg by mouth every evening 30MG IN THE MORNING AND 60MG IN THE EVENING   11/2/2021 at Unknown time     losartan (COZAAR) 100 MG tablet Take 100 mg by mouth daily   11/3/2021 at Unknown time     melatonin 5 MG tablet Take 5 mg by mouth At Bedtime   11/2/2021 at Unknown time     metFORMIN (GLUCOPHAGE) 500 MG tablet Take 1,000 mg by mouth 2 times daily (with meals)    11/1/2021     nortriptyline (PAMELOR) 10 MG capsule Take 10 mg by mouth At Bedtime   11/1/2021     sotalol (BETAPACE) 80 MG tablet Take 80 mg by mouth 2 times daily   11/3/2021 at 0900     traMADol (ULTRAM) 50 MG tablet Take  mg by mouth every 4 hours as needed for severe pain         vitamin D3 (CHOLECALCIFEROL) 50 mcg (2000 units) tablet Take 1 tablet by mouth daily   11/1/2021         Information source(s): Patient and  CareEverywhere/SureScripts  Patient was asked about OTC/herbal products specifically.  PTA med list reflects this.  Based on the pharmacist s assessment, the PTA med list information appears reliable  Allergies were reviewed, assessed, and updated with the patient.    Medications available for use during hospital stay: NONE  Thank you for the opportunity to participate in the care of this patient.    The patient was asked about OTC/herbal products specifically and the PTA med list reflects the patient's response.    Allergies were reviewed and assessed with the patient, responses were updated in the EMR.    Thank you for the opportunity to participate in the care of this patient.    Don Lorenzo RPH 11/3/2021 1:40 PM

## 2021-11-03 NOTE — ANESTHESIA PROCEDURE NOTES
Airway       Patient location during procedure: OR       Procedure Start/Stop Times: 11/3/2021 1:35 PM  Staff -        CRNA: Erwin Oliveros APRN CRNA       Performed By: CRNAIndications and Patient Condition       Indications for airway management: yousif-procedural       Induction type:intravenous       Mask difficulty assessment: 1 - vent by mask    Final Airway Details       Final airway type: endotracheal airway       Successful airway: ETT - single  Endotracheal Airway Details        ETT size (mm): 7.5       Cuffed: yes       Successful intubation technique: video laryngoscopy       VL Blade Size: Glidescope 3       Grade View of Cords: 2       Adjucts: stylet       Position: Right       Measured from: lips       Secured at (cm): 23    Post intubation assessment        Placement verified by: capnometry, equal breath sounds and chest rise        Number of attempts at approach: 1       Number of other approaches attempted: 0       Ease of procedure: easy    Additional Comments       Attempt under DL, zero view of cords. Easy view with GS

## 2021-11-03 NOTE — OR NURSING
Writer noted pt's Right hip double the size of her Left hip region. Right foot and knee internally rotated. Xray of R hip just completed.    Tissues are soft, nontender without obvious hematoma presentation. Skin cool but ice pack has been on site. VSS.   Pt unable to tell if hip and leg is abnormal for her at this time due to medications.   Dr Brown cell phone called, no answer, and unable to leave message.   Dr Brown's PA Yandy called and informed of the above.   She is not concerned at this time and states she was hemostatically okay during closure without bleeding.   Will continue to monitor site and inform provider if any changes are present.

## 2021-11-03 NOTE — ANESTHESIA PREPROCEDURE EVALUATION
Anesthesia Pre-Procedure Evaluation    Patient: Preeti Cooper   MRN: [id number] : 1950        Preoperative Diagnosis: Right hip pain [M25.551]    Procedure : Procedure(s):  RIGHT DIRECT ANTERIOR TOTAL HIP ARTHROPLASTY          Past Medical History:   Diagnosis Date     Antiplatelet or antithrombotic long-term use      Arrhythmia      Arthritis      COPD (chronic obstructive pulmonary disease) (H)      Coronary artery disease      Diabetes (H)      Hypertension      Irregular heart beat      Obese      Other chronic pain      Pacemaker      Sleep apnea      TIA (transient ischemic attack)       Past Surgical History:   Procedure Laterality Date     ATRIAL CARDIAC PACEMAKER INSERTION       CARDIAC SURGERY      ablation     ORTHOPEDIC SURGERY      LANIE      Allergies   Allergen Reactions     Atenolol      Sulfa Drugs      Lamotrigine Rash      Social History     Tobacco Use     Smoking status: Never Smoker     Smokeless tobacco: Never Used   Substance Use Topics     Alcohol use: Not Currently     Comment: rare      Wt Readings from Last 1 Encounters:   21 88.8 kg (195 lb 12.8 oz)        Anesthesia Evaluation   Pt has had prior anesthetic.     No history of anesthetic complications       ROS/MED HX  ENT/Pulmonary:     (+) sleep apnea, mild,  COPD,     Neurologic:  - neg neurologic ROS     Cardiovascular:     (+) hypertension--CAD ---Taking blood thinners (Last dose Eliquis  night.) pacemaker,     METS/Exercise Tolerance:     Hematologic:       Musculoskeletal:       GI/Hepatic:  - neg GI/hepatic ROS     Renal/Genitourinary:       Endo:     (+) type II DM, Obesity,     Psychiatric/Substance Use:       Infectious Disease:       Malignancy:       Other:            Physical Exam    Airway        Mallampati: II   TM distance: > 3 FB      Respiratory Devices and Support         Dental  no notable dental history         Cardiovascular          Rhythm and rate: regular and normal     Pulmonary            breath sounds clear to auscultation           OUTSIDE LABS:  CBC: No results found for: WBC, HGB, HCT, PLT  BMP: No results found for: NA, POTASSIUM, CHLORIDE, CO2, BUN, CR, GLC  COAGS: No results found for: PTT, INR, FIBR  POC: No results found for: BGM, HCG, HCGS  HEPATIC: No results found for: ALBUMIN, PROTTOTAL, ALT, AST, GGT, ALKPHOS, BILITOTAL, BILIDIRECT, [place]  OTHER: No results found for: PH, LACT, A1C, BECKY, PHOS, MAG, LIPASE, AMYLASE, TSH, T4, T3, CRP, SED    Anesthesia Plan    ASA Status:  3      Anesthesia Type: General.     - Airway: ETT   Induction: Intravenous.   Maintenance: TIVA.        Consents    Anesthesia Plan(s) and associated risks, benefits, and realistic alternatives discussed. Questions answered and patient/representative(s) expressed understanding.     - Discussed with:  Patient         Postoperative Care    Pain management: IV analgesics, Oral pain medications.   PONV prophylaxis: Ondansetron (or other 5HT-3), Dexamethasone or Solumedrol     Comments:    Discussed at length the relative risks of GETA vs SAB with likely some residual Eliquis effect. Current ANUJ recommendations are >72 hrs, we are at around 60 hrs for now. I am open to either option but somewhat favor GETA due to the relatively higher risk of spinal hematoma in the elderly female population. Pt agrees - plan for GETA.            Luis Daniel Sorenson MD

## 2021-11-04 ENCOUNTER — APPOINTMENT (OUTPATIENT)
Dept: PHYSICAL THERAPY | Facility: CLINIC | Age: 71
End: 2021-11-04
Attending: ORTHOPAEDIC SURGERY
Payer: COMMERCIAL

## 2021-11-04 ENCOUNTER — APPOINTMENT (OUTPATIENT)
Dept: OCCUPATIONAL THERAPY | Facility: CLINIC | Age: 71
End: 2021-11-04
Attending: ORTHOPAEDIC SURGERY
Payer: COMMERCIAL

## 2021-11-04 LAB
CREAT SERPL-MCNC: 0.74 MG/DL (ref 0.6–1.1)
GFR SERPL CREATININE-BSD FRML MDRD: 81 ML/MIN/1.73M2
GLUCOSE BLDC GLUCOMTR-MCNC: 161 MG/DL (ref 70–99)
GLUCOSE BLDC GLUCOMTR-MCNC: 197 MG/DL (ref 70–99)
GLUCOSE BLDC GLUCOMTR-MCNC: 214 MG/DL (ref 70–99)
GLUCOSE BLDC GLUCOMTR-MCNC: 218 MG/DL (ref 70–99)
HGB BLD-MCNC: 11.1 G/DL (ref 11.7–15.7)
POTASSIUM BLD-SCNC: 4 MMOL/L (ref 3.5–5)

## 2021-11-04 PROCEDURE — 85018 HEMOGLOBIN: CPT | Performed by: PHYSICIAN ASSISTANT

## 2021-11-04 PROCEDURE — 97162 PT EVAL MOD COMPLEX 30 MIN: CPT | Mod: GP

## 2021-11-04 PROCEDURE — 250N000013 HC RX MED GY IP 250 OP 250 PS 637: Performed by: PHYSICIAN ASSISTANT

## 2021-11-04 PROCEDURE — 84132 ASSAY OF SERUM POTASSIUM: CPT | Performed by: INTERNAL MEDICINE

## 2021-11-04 PROCEDURE — 97110 THERAPEUTIC EXERCISES: CPT | Mod: GP

## 2021-11-04 PROCEDURE — 36415 COLL VENOUS BLD VENIPUNCTURE: CPT | Performed by: PHYSICIAN ASSISTANT

## 2021-11-04 PROCEDURE — 82565 ASSAY OF CREATININE: CPT | Performed by: INTERNAL MEDICINE

## 2021-11-04 PROCEDURE — 250N000013 HC RX MED GY IP 250 OP 250 PS 637: Mod: GY | Performed by: INTERNAL MEDICINE

## 2021-11-04 PROCEDURE — 97116 GAIT TRAINING THERAPY: CPT | Mod: GP

## 2021-11-04 PROCEDURE — 97166 OT EVAL MOD COMPLEX 45 MIN: CPT | Mod: GO

## 2021-11-04 PROCEDURE — 97530 THERAPEUTIC ACTIVITIES: CPT | Mod: GP

## 2021-11-04 PROCEDURE — 97535 SELF CARE MNGMENT TRAINING: CPT | Mod: GO

## 2021-11-04 PROCEDURE — 82962 GLUCOSE BLOOD TEST: CPT

## 2021-11-04 PROCEDURE — 250N000009 HC RX 250: Performed by: PHYSICIAN ASSISTANT

## 2021-11-04 PROCEDURE — 99232 SBSQ HOSP IP/OBS MODERATE 35: CPT | Performed by: INTERNAL MEDICINE

## 2021-11-04 PROCEDURE — 250N000011 HC RX IP 250 OP 636: Performed by: PHYSICIAN ASSISTANT

## 2021-11-04 RX ADMIN — APIXABAN 5 MG: 5 TABLET, FILM COATED ORAL at 09:10

## 2021-11-04 RX ADMIN — DOCUSATE SODIUM 50MG AND SENNOSIDES 8.6MG 1 TABLET: 8.6; 5 TABLET, FILM COATED ORAL at 20:33

## 2021-11-04 RX ADMIN — HYDROMORPHONE HYDROCHLORIDE 0.4 MG: 0.2 INJECTION, SOLUTION INTRAMUSCULAR; INTRAVENOUS; SUBCUTANEOUS at 21:34

## 2021-11-04 RX ADMIN — ACETAMINOPHEN 975 MG: 325 TABLET ORAL at 14:38

## 2021-11-04 RX ADMIN — DOCUSATE SODIUM 50MG AND SENNOSIDES 8.6MG 1 TABLET: 8.6; 5 TABLET, FILM COATED ORAL at 09:11

## 2021-11-04 RX ADMIN — POLYETHYLENE GLYCOL 3350 17 G: 17 POWDER, FOR SOLUTION ORAL at 09:11

## 2021-11-04 RX ADMIN — ACETAMINOPHEN 975 MG: 325 TABLET ORAL at 05:59

## 2021-11-04 RX ADMIN — DULOXETINE HYDROCHLORIDE 30 MG: 30 CAPSULE, DELAYED RELEASE ORAL at 09:10

## 2021-11-04 RX ADMIN — GABAPENTIN 300 MG: 300 CAPSULE ORAL at 20:34

## 2021-11-04 RX ADMIN — OXYCODONE HYDROCHLORIDE 5 MG: 5 TABLET ORAL at 20:33

## 2021-11-04 RX ADMIN — METFORMIN HYDROCHLORIDE 1000 MG: 500 TABLET, FILM COATED ORAL at 17:43

## 2021-11-04 RX ADMIN — METOPROLOL TARTRATE 5 MG: 1 INJECTION, SOLUTION INTRAVENOUS at 20:37

## 2021-11-04 RX ADMIN — METFORMIN HYDROCHLORIDE 1000 MG: 500 TABLET, FILM COATED ORAL at 09:11

## 2021-11-04 RX ADMIN — SOTALOL HYDROCHLORIDE 80 MG: 80 TABLET ORAL at 20:34

## 2021-11-04 RX ADMIN — METOPROLOL TARTRATE 5 MG: 1 INJECTION, SOLUTION INTRAVENOUS at 14:39

## 2021-11-04 RX ADMIN — LOSARTAN POTASSIUM 100 MG: 50 TABLET, FILM COATED ORAL at 09:10

## 2021-11-04 RX ADMIN — NORTRIPTYLINE HYDROCHLORIDE 10 MG: 10 CAPSULE ORAL at 20:34

## 2021-11-04 RX ADMIN — AMLODIPINE BESYLATE 5 MG: 5 TABLET ORAL at 09:10

## 2021-11-04 RX ADMIN — SOTALOL HYDROCHLORIDE 80 MG: 80 TABLET ORAL at 09:10

## 2021-11-04 RX ADMIN — APIXABAN 5 MG: 5 TABLET, FILM COATED ORAL at 20:34

## 2021-11-04 RX ADMIN — ACETAMINOPHEN 975 MG: 325 TABLET ORAL at 21:35

## 2021-11-04 RX ADMIN — CEFAZOLIN SODIUM 2 G: 2 INJECTION, SOLUTION INTRAVENOUS at 06:01

## 2021-11-04 RX ADMIN — DULOXETINE HYDROCHLORIDE 60 MG: 30 CAPSULE, DELAYED RELEASE ORAL at 20:36

## 2021-11-04 RX ADMIN — ATORVASTATIN CALCIUM 40 MG: 40 TABLET, FILM COATED ORAL at 20:34

## 2021-11-04 ASSESSMENT — ACTIVITIES OF DAILY LIVING (ADL): IADL_COMMENTS: INDEP. ALL ADLS

## 2021-11-04 NOTE — PROGRESS NOTES
[facility] Daily Progress Note    Assessment/Plan:  71-year-old female status post right direct anterior total hip arthroplasty on 11/3/2021     Hypertension, essential   amlodipine 5 mg daily  losartan 100 mg daily  creatinine and potassium level reviewed and normal.      Diabetes mellitus type 2   Metformin 1000 mg p.o. twice daily  Increase novoLog correction insulin 1 unit per 25 mg greater than 140 mg/dL 4 times daily AC at bedtime     Postoperative anemia secondary to acute blood loss  Estimated blood loss 250 cc  Hemoglobin 11.1 g/dL. Patient is asymptomatic  No further work-up required at this time.     Supraventricular tachycardia s/p ablation 7/29/21.   History of atrial fibrillation/flutter, tachybrady syndrome.   Status post dual chamber pacemaker 8/23/21.   Continue sotalol 80 mg twice daily  Resume Eliquis 5 mg twice daily per orthopedics.     Chronic obstructive pulmonary disease  Continue albuterol prn.      Hypoxia  Likely residual effects of anesthesia  Continue supplemental oxygen.  Continue continuous pulse oximetry     Moderate obstructive sleep apnea  Continue home cpap     Major depression/anxiety  Continue home cymbalta     Hyperlipidemia  History of TIA  Continue atorvastatin 40 mg nightly    Active Problems:    S/P total hip arthroplasty     LOS: 0 days     Barriers to discharge: None  Discharge Disposition: Per orthopedics    Subjective:  Patient complains of right leg weakness.  Also complains of some pain extending from groin to thigh.  Denies any fever or chills.  No nausea or vomiting.  Tolerating diet.      acetaminophen  975 mg Oral Q8H     amLODIPine  5 mg Oral Daily     apixaban ANTICOAGULANT  5 mg Oral BID     atorvastatin  40 mg Oral At Bedtime     DULoxetine  30 mg Oral Daily     DULoxetine  60 mg Oral QPM     gabapentin  300 mg Oral At Bedtime     insulin aspart  1-7 Units Subcutaneous TID AC     insulin aspart  1-5 Units Subcutaneous At Bedtime     losartan  100 mg Oral Daily      metFORMIN  1,000 mg Oral BID w/meals     metoprolol  5 mg Intravenous Q6H     nortriptyline  10 mg Oral At Bedtime     polyethylene glycol  17 g Oral Daily     senna-docusate  1 tablet Oral BID     sodium chloride (PF)  3 mL Intracatheter Q8H     sotalol  80 mg Oral BID       Objective:  Vital signs in last 24 hours:  Temp:  [97.4  F (36.3  C)-99.9  F (37.7  C)] 97.5  F (36.4  C)  Pulse:  [70-96] 96  Resp:  [13-28] 19  BP: (130-197)/() 168/80  SpO2:  [92 %-100 %] 95 %  Weight:   Weight:   @THISENCWEIGHTS(1)@  Weight change:   Body mass index is 30.67 kg/m .    Intake/Output last 3 shifts:  I/O last 3 completed shifts:  In: 3209 [P.O.:460; I.V.:2749]  Out: 2650 [Urine:2400; Blood:250]  Intake/Output this shift:  No intake/output data recorded.    Review of Systems:   As per subjective, all others negative.    Physical Exam:    GENERAL:  Alert, appears comfortable, in no acute distress, appears stated age   HEAD:  Normocephalic, without obvious abnormality, atraumatic   NECK: Supple, symmetrical, trachea midline   BACK:   Symmetric, no curvature, ROM normal   LUNGS:   Clear to auscultation bilaterally, no rales, rhonchi, or wheezing, symmetric chest rise on inhalation, respirations unlabored   CHEST WALL:  No tenderness or deformity   HEART:  Regular rate and rhythm, S1 and S2 normal, no murmur, rub, or gallop    ABDOMEN:   Soft, non-tender, bowel sounds active all four quadrants, no masses, no organomegaly, no rebound or guarding   EXTREMITIES:  Right proximal incision bandage is clean.  Did not remove for direct visualization of wound.  There is some mild swelling around incision site.  No erythema.  No discharge noted.  5/5 plantar/dorsiflexion   SKIN: Dry to touch, no exanthems in the visualized areas   NEURO: Alert, oriented x3, moves all four extremities freely, non-focal   PSYCH: Cooperative, behavior is appropriate      Imaging:  Personally Reviewed.  Results for orders placed or performed during the  hospital encounter of 11/03/21   XR Pelvis and Hip Right 2 Views    Impression    IMPRESSION: Postop right total hip arthroplasty with proximal right femur cerclage wire fixation. No acute displaced periprosthetic fracture identified. Hypertrophic spurring along the superolateral right acetabulum. Expected postop soft tissue gas seen   about the right hip and the anterior right thigh. Left total hip arthroplasty in place. No acute displaced pelvic fracture. Degenerative change both sacroiliac joints and symphysis pubis. Arterial calcification.   XR Femur Port Right 2 Views    Impression    IMPRESSION: Right total hip arthroplasty, and cerclage wires are present in the proximal femur. Normal joint alignment. Hardware is well seated with no evidence of loosening or other complication. No acute fracture visualized in the right hip or right   pelvis. Postoperative in the soft tissues. Normal distal femur and thigh.       Lab Results:  Personally Reviewed.   Recent Labs   Lab 11/04/21  0705 11/03/21  1217   HGB 11.1* 13.4     No results for input(s): NA, CO2, BUN, CREATININE, ALBUMIN, BILITOT, ALKPHOS, ALT, AST, GLUCOSE in the last 168 hours.    Invalid input(s): K, CL, CALCIUM, LABALBU, PROT  Recent Labs   Lab 11/03/21  1217   INR 0.93       I reviewed all labs and imaging studies as of this date and I reviewed all current inpatient medications and updated them    Jani Babb DO, MS  [facility]  Internal Medicine

## 2021-11-04 NOTE — CONSULTS
Met with patient and her daughter, inderjit, to review role of care management, progression of care and possible need for services at discharge, including OP services, home care, or skilled nursing care. Patient alert, oriented and engaged in the conversation. Patient would like TCU at discharge and her first choice is [facility]. Reviewed TCU referral process with patient and her daughter. Daughter's choice would be [facility]- explained they are not currently accepting referrals due to staffing until next year. Given list of [facility] TCUs with Medicare ratings website. Patient would prefer to stay in [facility] area if possible. Referrals sent to additional preferences: [facility], [facility], [facility], [facility]. Referrals sent.     Care Management Initial Consult    General Information  Assessment completed with: Patient, Family,    Type of CM/SW Visit: Offer D/C Planning (initial assessment, CM role intro, progression of care)    Primary Care Provider verified and updated as needed:     Readmission within the last 30 days:           Advance Care Planning: Advance Care Planning Reviewed: no concerns identified          Communication Assessment  Patient's communication style: spoken language (English or Bilingual)    Hearing Difficulty or Deaf: no   Wear Glasses or Blind: yes    Cognitive  Cognitive/Neuro/Behavioral: WDL  Level of Consciousness: alert  Arousal Level: opens eyes spontaneously                Living Environment:   People in home: alone     Current living Arrangements: house      Able to return to prior arrangements: no       Family/Social Support:  Care provided by: self                  Description of Support System:    family       Current Resources:   Patient receiving home care services: No     Community Resources: None  Equipment currently used at home: raised toilet seat, grab bar, tub/shower  (walk in shower, sink near toilet)  Supplies currently used at home: None    Employment/Financial:  Employment Status: retired        Financial Concerns:             Lifestyle & Psychosocial Needs:  Social Determinants of Health     Tobacco Use: Low Risk      Smoking Tobacco Use: Never Smoker     Smokeless Tobacco Use: Never Used   Alcohol Use:      Frequency of Alcohol Consumption:      Average Number of Drinks:      Frequency of Binge Drinking:    Financial Resource Strain:      Difficulty of Paying Living Expenses:    Food Insecurity:      Worried About Running Out of Food in the Last Year:      Ran Out of Food in the Last Year:    Transportation Needs:      Lack of Transportation (Medical):      Lack of Transportation (Non-Medical):    Physical Activity:      Days of Exercise per Week:      Minutes of Exercise per Session:    Stress:      Feeling of Stress :    Social Connections:      Frequency of Communication with Friends and Family:      Frequency of Social Gatherings with Friends and Family:      Attends Bahai Services:      Active Member of Clubs or Organizations:      Attends Club or Organization Meetings:      Marital Status:    Intimate Partner Violence:      Fear of Current or Ex-Partner:      Emotionally Abused:      Physically Abused:      Sexually Abused:    Depression:      PHQ-2 Score:    Housing Stability:      Unable to Pay for Housing in the Last Year:      Number of Places Lived in the Last Year:      Unstable Housing in the Last Year:        Functional Status:  Prior to admission patient needed assistance:              Mental Health Status:          Chemical Dependency Status:                Values/Beliefs:  Spiritual, Cultural Beliefs, Bahai Practices, Values that affect care:

## 2021-11-04 NOTE — PLAN OF CARE
Problem: Adult Inpatient Plan of Care  Goal: Plan of Care Review  Outcome: Improving     Patient vital signs are at baseline: Yes  Patient able to ambulate as they were prior to admission or with assist devices provided by therapies during their stay:  No,  Reason:  Right knee continues to buckle.  Patient MUST void prior to discharge:  Yes  Patient able to tolerate oral intake:  Yes  Pain has adequate pain control using Oral analgesics:  Yes    Patient continues to complain of numbness on their right hip/thigh. They state this is improving but it continues to interfere with mobility. Patient working with PT/OT.

## 2021-11-04 NOTE — CONSULTS
"[facility] Medicine Service Consult Note.     Physician requesting consult: Neftali Brown MD  Thank you, Neftali Gomes MD, for asking the [facility] Medicine Service to see Preeti Cooper in consultation.    Assessment/Recommendations   Assessment/Plan: 71-year-old female status post right direct anterior total hip arthroplasty on 11/3/2021    Hypertension, essential   Order amlodipine 5 mg daily  Order losartan 100 mg daily  order creatinine and potassium level for AM  Hold parameters written, as patient is high risk of post operative hypotension.      Diabetes mellitus type 2   Metformin 1000 mg p.o. twice daily  Order creatinine potassium level for AM.  NovoLog correction insulin 1 unit per 50 mg greater than 140 mg/dL 4 times daily AC at bedtime    Postoperative blood loss.  Estimated blood loss 250 cc  Repeat hemoglobin in a.m.    Supraventricular tachycardia s/p ablation 7/29/21.   History of atrial fibrillation/flutter, tachybrady syndrome.   Status post dual chamber pacemaker 8/23/21.   Continue sotalol 80 mg twice daily  May resume Eliquis when appropriate per orthopedics.    Chronic obstructive pulmonary disease  Continue albuterol prn.     Hypoxia  Likely residual effects of anesthesia  Continue supplemental oxygen.  Continue continuous pulse oximetry    Moderate obstructive sleep apnea  Continue home cpap    Major depression/anxiety  Continue home cymbalta    Hyperlipidemia  History of TIA  Continue atorvastatin 40 mg nightly    Code status:Full Code  -Reviewed the patient's preoperative H and P and updated missing elements.  -Home medication reconciliation has been reviewed.      History of Present Illness/Subjective    HPI: Ms. Preeti Cooper is a 71 year old female status post Procedure(s):  RIGHT DIRECT ANTERIOR TOTAL HIP ARTHROPLASTY  Post-operative Day: Day of Surgery, hospital medicine was consulted for \"hospitalist consultation.\"    HTN. The patient had this " "problem for greater than 1 years.  Perioperative blood pressures have ranged between 147 to 197 mmHg systolic. Associated symptoms as none. They don't occur after anything.   Not taking blood pressure medicine makes it worse. Taking blood pressure medicine makes it better.     Preeti is currently on supplemental oxygen at 3 L/min via nasal cannula.  She is saturating between 98 and 100%.    Patient denies cerebrovascular accident, myocardial infarction, pulmonary embolism, or deep venous thrombosis.   Patient had reported TIA approximately 10 to 15 years ago.  No residual effects.    Estimated Blood Loss:  250 mL    I have reviewed preop physical including past medical hx, family hx, social hx, surgical hx, medication hx.      Physical Examination  Review of Systems   VITALS: BP (!) 151/73 (BP Location: Left arm)   Pulse 73   Temp 98.2  F (36.8  C) (Oral)   Resp 18   Ht 1.702 m (5' 7\")   Wt 88.8 kg (195 lb 12.8 oz)   SpO2 98%   BMI 30.67 kg/m    BMI: Body mass index is 30.67 kg/m .  Wt Readings from Last 3 Encounters:   11/03/21 88.8 kg (195 lb 12.8 oz)       Intake/Output Summary (Last 24 hours) at 11/3/2021 1902  Last data filed at 11/3/2021 1858  Gross per 24 hour   Intake 1600 ml   Output 450 ml   Net 1150 ml     General Appearance:   no acute distress.   ENT/Mouth: membranes moist, no oral lesions or bleeding gums.      EYES:  no scleral icterus, normal conjunctivae   Neck: no carotid bruits or thyromegaly   Chest/Lungs:   lungs are clear to auscultation, no rales or wheezing, equal chest wall expansion    Cardiovascular:   Regular. Normal S1/S2 heart sounds with no murmurs, rubs, or gallops.   Abdomen:  no organomegaly, masses, bruits, or tenderness; bowel sounds are present   Extremities: no cyanosis or clubbing   Skin: no xanthelasma, warm.    Neurologic: normal  bilateral, no tremors     Psychiatric: alert and oriented x3, calm     A 12 point comprehensive review of systems was negative except as " noted above.         Medical History  Surgical History Family History Social History   Patient Active Problem List    Diagnosis Date Noted     S/P total hip arthroplasty 11/03/2021     Priority: Medium    Past Surgical History:   Procedure Laterality Date     ATRIAL CARDIAC PACEMAKER INSERTION  2021     CARDIAC SURGERY  2021    ablation     ORTHOPEDIC SURGERY      LANIE     Reviewed, and family history is not on file.     Reviewed, and she  reports that she has never smoked. She has never used smokeless tobacco. She reports previous alcohol use. She reports that she does not use drugs.  Social History     Tobacco Use     Smoking status: Never Smoker     Smokeless tobacco: Never Used   Substance Use Topics     Alcohol use: Not Currently     Comment: rare        Medications  Allergies   Medications Prior to Admission   Medication Sig Dispense Refill Last Dose     albuterol (PROAIR HFA/PROVENTIL HFA/VENTOLIN HFA) 108 (90 Base) MCG/ACT inhaler Inhale 1-2 puffs into the lungs every 4 hours as needed    More than a month at Unknown time     amLODIPine (NORVASC) 5 MG tablet Take 5 mg by mouth daily   11/3/2021 at Unknown time     apixaban ANTICOAGULANT (ELIQUIS) 5 MG tablet Take 5 mg by mouth 2 times daily   10/31/2021 at 2200     atorvastatin (LIPITOR) 40 MG tablet Take 40 mg by mouth At Bedtime    11/2/2021 at Unknown time     DULoxetine (CYMBALTA) 30 MG capsule Take 30 mg by mouth daily 30MG IN THE MORNING AND 60MG IN THE EVENING   11/2/2021 at Unknown time     DULoxetine (CYMBALTA) 60 MG capsule Take 60 mg by mouth every evening 30MG IN THE MORNING AND 60MG IN THE EVENING   11/2/2021 at Unknown time     losartan (COZAAR) 100 MG tablet Take 100 mg by mouth daily   11/3/2021 at Unknown time     melatonin 5 MG tablet Take 5 mg by mouth At Bedtime   11/2/2021 at Unknown time     metFORMIN (GLUCOPHAGE) 500 MG tablet Take 1,000 mg by mouth 2 times daily (with meals)    11/1/2021     nortriptyline (PAMELOR) 10 MG capsule Take  10 mg by mouth At Bedtime   11/1/2021     sotalol (BETAPACE) 80 MG tablet Take 80 mg by mouth 2 times daily   11/3/2021 at 0900     traMADol (ULTRAM) 50 MG tablet Take  mg by mouth every 4 hours as needed for severe pain         vitamin D3 (CHOLECALCIFEROL) 50 mcg (2000 units) tablet Take 1 tablet by mouth daily   11/1/2021       Allergies   Allergen Reactions     Atenolol      Sulfa Drugs      Lamotrigine Rash         Cardiographics Reviewed Personally By Myself   EKG Results: personally reviewed.  Normal sinus rhythm no acute ST or T wave changes.    Imaging Reviewed Personally By Myself    Radiology Results:   Recent Results (from the past 24 hour(s))   XR Surgery DARREL  Fluoro G/T 5 Min    Narrative    This exam was marked as non-reportable because it will not be read by a   radiologist or a [facility] non-radiologist provider.         XR Pelvis and Hip Right 2 Views    Narrative    EXAM: XR PELVIS AND HIP RIGHT 2 VIEWS  LOCATION: [facility]  DATE/TIME: 11/3/2021 4:32 PM    INDICATION: Status post surgery.  COMPARISON: None.      Impression    IMPRESSION: Postop right total hip arthroplasty with proximal right femur cerclage wire fixation. No acute displaced periprosthetic fracture identified. Hypertrophic spurring along the superolateral right acetabulum. Expected postop soft tissue gas seen   about the right hip and the anterior right thigh. Left total hip arthroplasty in place. No acute displaced pelvic fracture. Degenerative change both sacroiliac joints and symphysis pubis. Arterial calcification.   XR Femur Port Right 2 Views    Narrative    EXAM: XR FEMUR PORT RIGHT 2 VIEWS  LOCATION: [facility]  DATE/TIME: 11/3/2021 5:25 PM    INDICATION: Right hip swelling after surgery.  COMPARISON: None.      Impression    IMPRESSION: Right total hip arthroplasty, and cerclage wires are present in the proximal femur. Normal joint alignment. Hardware is well seated  with no evidence of loosening or other complication. No acute fracture visualized in the right hip or right   pelvis. Postoperative in the soft tissues. Normal distal femur and thigh.       Labs Reviewed Personally By Myself     Most Recent 3 CBC's:Recent Labs   Lab Test 11/03/21  1217   HGB 13.4     Most Recent 3 BMP's:Recent Labs   Lab Test 11/03/21  1614 11/03/21  1258 11/03/21  1219   CR  --  0.74  --    *  --  151*       Thank you for this consultation.  Appreciate the opportunity to participate in the care of Preeti Cooper, please feel free to contact us for any questions or concerns.    Jani Babb DO, MS  [facility] Service  Internal Medicine  Phone: #[phone]

## 2021-11-04 NOTE — PROGRESS NOTES
11/04/21 0800   Quick Adds   Quick Adds Certification   Type of Visit Initial PT Evaluation   Living Environment   People in home alone   Current Living Arrangements house  (one level senior house)   Home Accessibility stairs to enter home   Number of Stairs, Main Entrance 1   Stair Railings, Main Entrance   (1 platform step/threshold to enter)   Self-Care   Usual Activity Tolerance excellent   Current Activity Tolerance poor   Equipment Currently Used at Home cane, straight   Disability/Function   Fall history within last six months no   General Information   Onset of Illness/Injury or Date of Surgery 11/03/21   Referring Physician Neftali Brown MD   Patient/Family Therapy Goals Statement (PT) home   Pertinent History of Current Problem (include personal factors and/or comorbidities that impact the POC) s/p R LANIE, h/o L LANIE   Existing Precautions/Restrictions no known precautions/restrictions   Weight-Bearing Status - LLE full weight-bearing   Weight-Bearing Status - RLE weight-bearing as tolerated   Strength   Strength Comments decreased strength R LE s/p R LANIE, R quad function at terminal ext, unable to LAQ/SAQ at this time - max A but able to palpate quad function - PA informed, able to quad set   Bed Mobility   Bed Mobility supine-sit   Supine-Sit [place] (Bed Mobility) supervision;verbal cues   Comment (Bed Mobility) cueing for hand placement, HOB elevated used bed rail   Transfers   Transfers sit-stand transfer   Sit-Stand Transfer   Sit-Stand [place] (Transfers) maximum assist (25% patient effort);2 person assist   Assistive Device (Sit-Stand Transfers) walker, front-wheeled   Gait/Stairs (Locomotion)   [place] Level (Gait) moderate assist (50% patient effort);maximum assist (25% patient effort);2 person assist   Assistive Device (Gait) walker, front-wheeled   Distance in Feet (Required for LE Total Joints) 2xSPT   Pattern (Gait) step-to   Deviations/Abnormal Patterns (Gait)  antalgic;moisés decreased;gait speed decreased;stride length decreased;weight shifting decreased;other (see comments)  (R LE buckling with weight bearing)   Clinical Impression   Criteria for Skilled Therapeutic Intervention yes, treatment indicated   PT Diagnosis (PT) impaired functional mobility   Influenced by the following impairments weakness, pain   Functional limitations due to impairments gait, transfers   Clinical Presentation Evolving/Changing   Clinical Presentation Rationale pt presents as medically diagnosed   Clinical Decision Making (Complexity) moderate complexity   Therapy Frequency (PT) 2x/day   Predicted Duration of Therapy Intervention (days/wks) 1 week   Planned Therapy Interventions (PT) bed mobility training;gait training;home exercise program;patient/family education;stair training;strengthening;transfer training   Anticipated Equipment Needs at Discharge (PT) walker, rolling   Risk & Benefits of therapy have been explained care plan/treatment goals reviewed;patient   PT Discharge Planning    PT Discharge Recommendation (DC Rec) Transitional Care Facility   PT Rationale for DC Rec pending improved stability and strength, requiring Max A x2 for functional mobility   Therapy Certification   Start of care date 11/04/21   Certification date from 11/04/21   Certification date to 12/04/21   Medical Diagnosis OA   Total Evaluation Time   Total Evaluation Time (Minutes) 5

## 2021-11-04 NOTE — PLAN OF CARE
Patient vital signs are at baseline: Yes  Patient able to ambulate as they were prior to admission or with assist devices provided by therapies during their stay:  No,  Reason:  Pt still using a bedside commode and reports that her knee arash while attempting to ambulate. Will continue to monitor.   Patient MUST void prior to discharge:  Yes  Patient able to tolerate oral intake:  Yes  Pain has adequate pain control using Oral analgesics:  Yes    Pt is A&O and able to make needs known, taking scheduled Tylenol for pain and using home CPAP. Pt still using a bedside commode and reports that her knee arash while attempting to ambulate. Reports numbness to her upper right thigh and does have baseline neuropathy to both her feet. BS this morning was 218 and covered with 2 units of insulin. Will continue to monitor.

## 2021-11-04 NOTE — PROGRESS NOTES
"Orthopedic Progress Note      Assessment: 1 Day Post-Op  S/P Procedure(s):  RIGHT DIRECT ANTERIOR TOTAL HIP ARTHROPLASTY     Plan:   - Continue PT/OT  - Weightbearing status: WBAT  - Anticoagulation: Eliquis in addition to SCDs, linda stockings and early ambulation.  - Discharge planning: Home vs TCU, therapy is currently recommending TCU      Subjective:  Pain: mild  Nausea, Vomiting:  No  Lightheadedness, Dizziness:  No  Neuro:  Patient denies new onset numbness or paresthesias    Patient os doing okay today. She is very concerned that she is having some weakness of her right quad muscle. We had a very long discussion that her quad is working and that she does have sensation throughout the entire leg. She does have some weakness in her quad but she states this has improved from yesterday. I do expect this to continue to improve for her. She will work with therapy this afternoon and likely again tomorrow and see how things are going before making a decision for herself at discharge.    Objective:  BP (!) 156/77 (BP Location: Right arm, Patient Position: Sitting)   Pulse 88   Temp 97.5  F (36.4  C) (Oral)   Resp 19   Ht 1.702 m (5' 7\")   Wt 88.8 kg (195 lb 12.8 oz)   SpO2 95%   BMI 30.67 kg/m    The patient is A&Ox3. Appears comfortable.   Sensation is intact.  Dorsiflexion and plantar flexion is intact.  Quad is firing but she does have weakness  Dorsalis pedis pulse intact.  Calves are soft and non-tender. Negative Enrico's.  The incision is covered. Dressing C/D/I.    Pertinent Labs   Lab Results: personally reviewed.   Lab Results   Component Value Date    INR 0.93 11/03/2021     Lab Results   Component Value Date    HGB 11.1 (L) 11/04/2021     No results found for: NA, CO2      Report completed by:  Jamilah Best PA-C, MICH  Date: 11/4/2021  Time: 1:10 PM    "

## 2021-11-04 NOTE — CONSULTS
"ACUPUNCTURIST TREATMENT NOTE    Name: Preeti Cooper  :  1950  MRN:  [id number]    Acupuncture Treatment  Patient Type: Orthopedic  Intervention Reason: Neuropathy  Neuropathy Location: (R) leg  Pre-session Neuropathy rating: 10  Post-session Neuropathy ratin  Patient complaint:: (R) leg paresthesia from upper thigh all way to foot  Initial insertions: (L) GB 31, (L) Vastus Lateralis MP, (L) Vastus Medialis MP, St 36, Sp 9, LI 4, Carmella 3, Ba Brian         \"Risks and benefits of acupuncture were discussed with patient. Consent for treatment was given. We thank you for the referral.\"     Caridad Rogers L.Ac.     Date:  2021  Time:  11:00 AM    "

## 2021-11-04 NOTE — PROGRESS NOTES
[facility]      OUTPATIENT PHYSICAL THERAPY EVALUATION  PLAN OF TREATMENT FOR OUTPATIENT REHABILITATION  (COMPLETE FOR INITIAL CLAIMS ONLY)  Patient's Last Name, First Name, M.I.  YOB: 1950  Preeti Cooper                        Provider's Name  [facility] Medical Record No.  [id number]                               Onset Date:  11/03/21   Start of Care Date:  11/04/21      Type:     _X_PT   ___OT   ___SLP Medical Diagnosis:  OA                        PT Diagnosis:  impaired functional mobility   Visits from SOC:  1   _________________________________________________________________________________  Plan of Treatment/Functional Goals    Planned Interventions: bed mobility training, gait training, home exercise program, patient/family education, stair training, strengthening, transfer training     Goals: See Physical Therapy Goals on Care Plan in [employer] electronic health record.    Therapy Frequency: 2x/day  Predicted Duration of Therapy Intervention: 1 week  _________________________________________________________________________________    I CERTIFY THE NEED FOR THESE SERVICES FURNISHED UNDER        THIS PLAN OF TREATMENT AND WHILE UNDER MY CARE     (Physician co-signature of this document indicates review and certification of the therapy plan).                Certification date from: 11/04/21, Certification date to: 12/04/21    Referring Physician: Neftali Brown MD            Initial Assessment        See Physical Therapy evaluation dated 11/04/21 in Epic electronic health record.

## 2021-11-04 NOTE — PLAN OF CARE
Patient vital signs are at baseline: Yes  Patient able to ambulate as they were prior to admission or with assist devices provided by therapies during their stay:  No,  Reason:  Right knee is buckling with standing and ambulating. Patient unable to ambulate.  Patient MUST void prior to discharge:  Yes  Patient able to tolerate oral intake:  Yes  Pain has adequate pain control using Oral analgesics:  Yes    Caroline Freire RN 11/4/2021

## 2021-11-04 NOTE — PROGRESS NOTES
11/04/21 0925   Quick Adds   Quick Adds Certification   Type of Visit Initial Occupational Therapy Evaluation   Living Environment   Living Environment Comments see PT note   Self-Care   Current Activity Tolerance poor   Equipment Currently Used at Home raised toilet seat;grab bar, tub/shower  (walk in shower, sink near toilet)   Instrumental Activities of Daily Living (IADL)   IADL Comments indep. all ADLs   Disability/Function   Hearing Difficulty or Deaf no   Wear Glasses or Blind yes   Vision Management glasses   Concentrating, Remembering or Making Decisions Difficulty no   Difficulty Communicating no   Difficulty Eating/Swallowing no   Dressing/Bathing Difficulty no   Toileting issues no   Doing Errands Independently Difficulty (such as shopping) yes   Fall history within last six months no   General Information   Onset of Illness/Injury or Date of Surgery 11/03/21   Patient/Family Therapy Goal Statement (OT) less weakness R LE   Existing Precautions/Restrictions fall  (no hip precautions)   Left Lower Extremity (Weight-bearing Status) weight-bearing as tolerated (WBAT)   Right Lower Extremity (Weight-bearing Status) weight-bearing as tolerated (WBAT)   Cognitive Status Examination   Affect/Mental Status (Cognitive) WFL   Visual Perception   Visual Impairment/Limitations corrective lenses full-time   Sensory   Sensory Quick Adds   (numbness R thigh, groin)   Range of Motion Comprehensive   General Range of Motion no range of motion deficits identified   Strength Comprehensive (MMT)   General Manual Muscle Testing (MMT) Assessment no strength deficits identified  (in B UE's)   Coordination   Upper Extremity Coordination No deficits were identified   Bed Mobility   Bed Mobility supine-sit;sit-supine   Supine-Sit [place] (Bed Mobility) supervision   Sit-Supine [place] (Bed Mobility) supervision   Comment (Bed Mobility) w/ effort, difficulty w/ R LE-weakness   Transfers   Transfers toilet  transfer;bed-chair transfer   Transfer Skill: Bed to Chair/Chair to Bed   Bed-Chair [place] (Transfers) minimum assist (75% patient effort)   Assistive Device (Bed-Chair Transfers) rolling walker   Toilet Transfer   Type (Toilet Transfer) sit-stand;stand-sit   [place] Level (Toilet Transfer) minimum assist (75% patient effort)   Assistive Device (Toilet Transfer) walker, front-wheeled   Balance   Balance Assessment standing balance: static   Standing Balance: Static fair balance   Upper Body Dressing Assessment   [place] Level (Upper Body Dressing) independent   Position (Upper Body Dressing) unsupported sitting   Lower Body Dressing Assessment   [place] Level (Lower Body Dressing) minimum assist (75% patient effort)   Assistive Devices (Lower Body Dressing) reacher   Position (Lower Body Dressing) unsupported sitting   Comment (Lower Body Dressing) pants, underwear,    Toileting   [place] Level (Toileting) minimum assist (75% patient effort)   Assistive Devices (Toileting) commode chair   Position (Toileting) supported sitting;supported standing   Clinical Impression   Criteria for Skilled Therapeutic Interventions Met (OT) yes   OT Diagnosis decreased ADLs   OT Problem List-Impairments impacting ADL balance;mobility   Assessment of Occupational Performance 1-3 Performance Deficits   Identified Performance Deficits drsg, trsfs, bed mob   Planned Therapy Interventions (OT) ADL retraining;transfer training   Clinical Decision Making Complexity (OT) moderate complexity   Therapy Frequency (OT) Daily   Predicted Duration of Therapy 3 days   Anticipated Equipment Needs Upon Discharge (OT) shower chair  (grab bar toilet)   Risk & Benefits of therapy have been explained patient   OT Discharge Planning    OT Discharge Recommendation (DC Rec) Transitional Care Facility;home with home care occupational therapy   OT Rationale for DC Rec rec. 24 hr assist w/ all transfers, fall risk   Therapy  Certification   Start of Care Date 11/04/21   Certification date from 11/04/21   Certification date to 12/04/21   Medical Diagnosis LANIE   Total Evaluation Time (Minutes)   Total Evaluation Time (Minutes) 10

## 2021-11-04 NOTE — PROGRESS NOTES
[facility]      OUTPATIENT OCCUPATIONAL THERAPY  EVALUATION  PLAN OF TREATMENT FOR OUTPATIENT REHABILITATION  (COMPLETE FOR INITIAL CLAIMS ONLY)  Patient's Last Name, First Name, M.I.  YOB: 1950  Preeti Cooper                          Provider's Name  [facility] Medical Record No.  [id number]                               Onset Date:  11/03/21   Start of Care Date:  11/04/21     Type:     ___PT   _X_OT   ___SLP Medical Diagnosis:  LANIE                        OT Diagnosis:  decreased ADLs   Visits from SOC:  1   _________________________________________________________________________________  Plan of Treatment/Functional Goals    Planned Interventions: ADL retraining, transfer training   Goals: See Occupational Therapy Goals on Care Plan in [facility] electronic health record.    Therapy Frequency: Daily  Predicted Duration of Therapy Intervention: 3 days  _________________________________________________________________________________    I CERTIFY THE NEED FOR THESE SERVICES FURNISHED UNDER        THIS PLAN OF TREATMENT AND WHILE UNDER MY CARE     (Physician co-signature of this document indicates review and certification of the therapy plan).                Certification date from: 11/04/21, Certification date to: 12/04/21                 Initial Assessment        See Occupational Therapy evaluation dated 11/04/21 in Epic electronic health record.

## 2021-11-05 ENCOUNTER — DOCUMENTATION ONLY (OUTPATIENT)
Dept: ORTHOPEDICS | Facility: CLINIC | Age: 71
End: 2021-11-05

## 2021-11-05 ENCOUNTER — APPOINTMENT (OUTPATIENT)
Dept: PHYSICAL THERAPY | Facility: CLINIC | Age: 71
End: 2021-11-05
Attending: ORTHOPAEDIC SURGERY
Payer: COMMERCIAL

## 2021-11-05 ENCOUNTER — APPOINTMENT (OUTPATIENT)
Dept: OCCUPATIONAL THERAPY | Facility: CLINIC | Age: 71
End: 2021-11-05
Attending: ORTHOPAEDIC SURGERY
Payer: COMMERCIAL

## 2021-11-05 LAB
GLUCOSE BLDC GLUCOMTR-MCNC: 149 MG/DL (ref 70–99)
GLUCOSE BLDC GLUCOMTR-MCNC: 157 MG/DL (ref 70–99)
GLUCOSE BLDC GLUCOMTR-MCNC: 179 MG/DL (ref 70–99)
GLUCOSE BLDC GLUCOMTR-MCNC: 256 MG/DL (ref 70–99)
HGB BLD-MCNC: 10.3 G/DL (ref 11.7–15.7)

## 2021-11-05 PROCEDURE — 250N000013 HC RX MED GY IP 250 OP 250 PS 637: Mod: GY | Performed by: INTERNAL MEDICINE

## 2021-11-05 PROCEDURE — 250N000013 HC RX MED GY IP 250 OP 250 PS 637: Performed by: PHYSICIAN ASSISTANT

## 2021-11-05 PROCEDURE — 97535 SELF CARE MNGMENT TRAINING: CPT | Mod: GO

## 2021-11-05 PROCEDURE — 250N000011 HC RX IP 250 OP 636: Performed by: PHYSICIAN ASSISTANT

## 2021-11-05 PROCEDURE — 97530 THERAPEUTIC ACTIVITIES: CPT | Mod: GP

## 2021-11-05 PROCEDURE — 250N000009 HC RX 250: Performed by: PHYSICIAN ASSISTANT

## 2021-11-05 PROCEDURE — 85018 HEMOGLOBIN: CPT | Performed by: PHYSICIAN ASSISTANT

## 2021-11-05 PROCEDURE — 82962 GLUCOSE BLOOD TEST: CPT | Mod: 91

## 2021-11-05 PROCEDURE — 99232 SBSQ HOSP IP/OBS MODERATE 35: CPT | Performed by: INTERNAL MEDICINE

## 2021-11-05 PROCEDURE — 97116 GAIT TRAINING THERAPY: CPT | Mod: GP

## 2021-11-05 PROCEDURE — 36415 COLL VENOUS BLD VENIPUNCTURE: CPT | Performed by: PHYSICIAN ASSISTANT

## 2021-11-05 RX ORDER — HYDROXYZINE HYDROCHLORIDE 10 MG/1
10 TABLET, FILM COATED ORAL EVERY 6 HOURS PRN
Qty: 30 TABLET | Refills: 0 | Status: SHIPPED | OUTPATIENT
Start: 2021-11-05

## 2021-11-05 RX ORDER — OXYCODONE HYDROCHLORIDE 5 MG/1
5-10 TABLET ORAL EVERY 4 HOURS PRN
Qty: 28 TABLET | Refills: 0 | Status: SHIPPED | OUTPATIENT
Start: 2021-11-05

## 2021-11-05 RX ORDER — GABAPENTIN 300 MG/1
300 CAPSULE ORAL AT BEDTIME
Qty: 30 CAPSULE | Refills: 0 | Status: SHIPPED | OUTPATIENT
Start: 2021-11-05

## 2021-11-05 RX ORDER — ACETAMINOPHEN 325 MG/1
650 TABLET ORAL EVERY 4 HOURS PRN
Qty: 100 TABLET | Refills: 0 | Status: SHIPPED | OUTPATIENT
Start: 2021-11-05

## 2021-11-05 RX ORDER — AMOXICILLIN 250 MG
1 CAPSULE ORAL 2 TIMES DAILY
Qty: 40 TABLET | Refills: 0 | Status: SHIPPED | OUTPATIENT
Start: 2021-11-05

## 2021-11-05 RX ADMIN — DOCUSATE SODIUM 50MG AND SENNOSIDES 8.6MG 1 TABLET: 8.6; 5 TABLET, FILM COATED ORAL at 22:44

## 2021-11-05 RX ADMIN — METOPROLOL TARTRATE 5 MG: 1 INJECTION, SOLUTION INTRAVENOUS at 08:54

## 2021-11-05 RX ADMIN — METFORMIN HYDROCHLORIDE 1000 MG: 500 TABLET, FILM COATED ORAL at 08:49

## 2021-11-05 RX ADMIN — ACETAMINOPHEN 975 MG: 325 TABLET ORAL at 22:45

## 2021-11-05 RX ADMIN — METFORMIN HYDROCHLORIDE 1000 MG: 500 TABLET, FILM COATED ORAL at 17:37

## 2021-11-05 RX ADMIN — ATORVASTATIN CALCIUM 40 MG: 40 TABLET, FILM COATED ORAL at 22:45

## 2021-11-05 RX ADMIN — DULOXETINE HYDROCHLORIDE 30 MG: 30 CAPSULE, DELAYED RELEASE ORAL at 08:49

## 2021-11-05 RX ADMIN — APIXABAN 5 MG: 5 TABLET, FILM COATED ORAL at 22:45

## 2021-11-05 RX ADMIN — APIXABAN 5 MG: 5 TABLET, FILM COATED ORAL at 08:49

## 2021-11-05 RX ADMIN — OXYCODONE HYDROCHLORIDE 10 MG: 5 TABLET ORAL at 04:35

## 2021-11-05 RX ADMIN — POLYETHYLENE GLYCOL 3350 17 G: 17 POWDER, FOR SOLUTION ORAL at 08:50

## 2021-11-05 RX ADMIN — DOCUSATE SODIUM 50MG AND SENNOSIDES 8.6MG 1 TABLET: 8.6; 5 TABLET, FILM COATED ORAL at 08:50

## 2021-11-05 RX ADMIN — OXYCODONE HYDROCHLORIDE 10 MG: 5 TABLET ORAL at 13:20

## 2021-11-05 RX ADMIN — OXYCODONE HYDROCHLORIDE 10 MG: 5 TABLET ORAL at 08:50

## 2021-11-05 RX ADMIN — AMLODIPINE BESYLATE 5 MG: 5 TABLET ORAL at 08:49

## 2021-11-05 RX ADMIN — LOSARTAN POTASSIUM 100 MG: 50 TABLET, FILM COATED ORAL at 08:49

## 2021-11-05 RX ADMIN — OXYCODONE HYDROCHLORIDE 10 MG: 5 TABLET ORAL at 18:07

## 2021-11-05 RX ADMIN — DULOXETINE HYDROCHLORIDE 60 MG: 30 CAPSULE, DELAYED RELEASE ORAL at 22:43

## 2021-11-05 RX ADMIN — ACETAMINOPHEN 975 MG: 325 TABLET ORAL at 06:25

## 2021-11-05 RX ADMIN — GABAPENTIN 300 MG: 300 CAPSULE ORAL at 22:45

## 2021-11-05 RX ADMIN — OXYCODONE HYDROCHLORIDE 10 MG: 5 TABLET ORAL at 22:45

## 2021-11-05 RX ADMIN — SOTALOL HYDROCHLORIDE 80 MG: 80 TABLET ORAL at 11:01

## 2021-11-05 RX ADMIN — NORTRIPTYLINE HYDROCHLORIDE 10 MG: 10 CAPSULE ORAL at 22:45

## 2021-11-05 RX ADMIN — ACETAMINOPHEN 975 MG: 325 TABLET ORAL at 13:21

## 2021-11-05 RX ADMIN — HYDROMORPHONE HYDROCHLORIDE 0.4 MG: 0.2 INJECTION, SOLUTION INTRAMUSCULAR; INTRAVENOUS; SUBCUTANEOUS at 19:53

## 2021-11-05 RX ADMIN — HYDROMORPHONE HYDROCHLORIDE 0.4 MG: 0.2 INJECTION, SOLUTION INTRAMUSCULAR; INTRAVENOUS; SUBCUTANEOUS at 14:43

## 2021-11-05 RX ADMIN — SOTALOL HYDROCHLORIDE 80 MG: 80 TABLET ORAL at 22:45

## 2021-11-05 NOTE — PROGRESS NOTES
"Orthopedic Progress Note      Assessment: 2 Days Post-Op  S/P Procedure(s):  RIGHT DIRECT ANTERIOR TOTAL HIP ARTHROPLASTY     Plan:   - Continue PT/OT  - Weightbearing status: WBAT   - Knee immobilizer ordered to wear while walking  - Anticoagulation: Eiquis in addition to SCDs, linda stockings and early ambulation.  - Discharge planning: TCU tomorrow      Subjective:  Pain: moderate  Nausea, Vomiting:  No  Lightheadedness, Dizziness:  No  Neuro:  Patient denies new onset numbness or paresthesias    Patient is doing okay today. States the numbness has slightly improved. She feels the thigh is better than yesterday but she is still having weakness.     Objective:  /63 (BP Location: Right arm)   Pulse 90   Temp 99  F (37.2  C) (Oral)   Resp 18   Ht 1.702 m (5' 7\")   Wt 88.8 kg (195 lb 12.8 oz)   SpO2 (!) 85%   BMI 30.67 kg/m    The patient is A&Ox3. Appears comfortable.   Sensation is intact but complains of dullness  Dorsiflexion and plantar flexion is intact.  Quad weakness, lacks 70 degrees to full extension  Dorsalis pedis pulse intact.  Calves are soft and non-tender. Negative Enrico's.  The incision is covered. Dressing C/D/I.    Pertinent Labs   Lab Results: personally reviewed.   Lab Results   Component Value Date    INR 0.93 11/03/2021     Lab Results   Component Value Date    HGB 10.3 (L) 11/05/2021     No results found for: NA, CO2      Report completed by:  Jamilah Best PA-C, MICH  Date: 11/5/2021  Time: 11:22 AM    "

## 2021-11-05 NOTE — PROGRESS NOTES
S: I was asked to consult for Preeti Cooper at [facility], Room 3E Room 3104-01.  The patient is experiencing quadricep weakness.    O: When I arrived at the nurses  station, the patient s Nurse, Tuyet, explained to me that she was told that the patient should be provided with a knee immobilizer and was able to obtain the appropriate size knee immobilizer from the 3rd floor supply closet.     I explained that I had brought a locking a post-op style hinged knee brace with me as an alternative option.      A: Nurse Tuyet and I discussed the pros and cons of each style of brace.  The knee immobilizer is a more comfortable brace to wear.  The immobilizer might not have the range of motion options, however, if a patient needs to put a brace on quickly to use the restroom, or sleep in the brace, the knee immobilizer is the more comfortable option.    P: The patient will wear the brace knee immobilizer as directed by a physician.      G: The goal is to maintain joint alignment, provide protective joint stability, prevent knee collapse and increase the patient's ability to safely perform ADLs.    Please contact the [facility] Department Office at [phone] if you have any questions or concerns.  Thank you, Wil    Electronically signed by Wil Jasso CPO, ANCELMOO

## 2021-11-05 NOTE — PROGRESS NOTES
[facility] Daily Progress Note    Assessment/Plan:  71-year-old female status post right direct anterior total hip arthroplasty on 11/3/2021.     Hypertension, essential   amlodipine 5 mg daily  losartan 100 mg daily  creatinine and potassium level reviewed and normal.      Diabetes mellitus type 2   Metformin 1000 mg p.o. twice daily  NovoLog correction insulin 1 unit per 25 mg greater than 140 mg/dL 4 times daily AC at bedtime     Postoperative anemia secondary to acute blood loss  Estimated blood loss 250 cc  Hemoglobin 10 g/dL. Patient is asymptomatic  No further work-up required at this time.     Supraventricular tachycardia s/p ablation 7/29/21.   History of atrial fibrillation/flutter, tachybrady syndrome.   Status post dual chamber pacemaker 8/23/21.   Continue sotalol 80 mg twice daily  Eliquis 5 mg twice daily per orthopedics.     Chronic obstructive pulmonary disease  Continue albuterol prn.      Hypoxia  Likely residual effects of anesthesia  Continue supplemental oxygen.  Continue continuous pulse oximetry     Moderate obstructive sleep apnea  Continue home cpap     Major depression/anxiety  Continue home cymbalta     Hyperlipidemia  History of TIA  Continue atorvastatin 40 mg nightly       Active Problems:    S/P total hip arthroplasty     LOS: 0 days     Barriers to discharge: No medical needs.   Discharge Disposition: per orthopedics.     Subjective:  Preeti reports pain is under better control today. She is looking forward to physical and occupational therapy. She denies any fever or chills. No nausea or vomiting. Tolerating diet. Passing flatus no bowel movement.      acetaminophen  975 mg Oral Q8H     amLODIPine  5 mg Oral Daily     apixaban ANTICOAGULANT  5 mg Oral BID     atorvastatin  40 mg Oral At Bedtime     DULoxetine  30 mg Oral Daily     DULoxetine  60 mg Oral QPM     gabapentin  300 mg Oral At Bedtime     insulin aspart  1-10 Units Subcutaneous TID AC     insulin aspart  1-7 Units Subcutaneous At  Bedtime     losartan  100 mg Oral Daily     metFORMIN  1,000 mg Oral BID w/meals     metoprolol  5 mg Intravenous Q6H     nortriptyline  10 mg Oral At Bedtime     polyethylene glycol  17 g Oral Daily     senna-docusate  1 tablet Oral BID     sodium chloride (PF)  3 mL Intracatheter Q8H     sotalol  80 mg Oral BID       Objective:  Vital signs in last 24 hours:  Temp:  [97.5  F (36.4  C)-98.4  F (36.9  C)] 97.9  F (36.6  C)  Pulse:  [80-96] 80  Resp:  [16-19] 16  BP: (128-168)/(64-80) 131/64  SpO2:  [93 %-97 %] 94 %  Weight:   Weight:   @THISENCWEIGHTS(1)@  Weight change:   Body mass index is 30.67 kg/m .    Intake/Output last 3 shifts:  I/O last 3 completed shifts:  In: 740 [P.O.:740]  Out: 1975 [Urine:1975]  Intake/Output this shift:  No intake/output data recorded.    Review of Systems:   As per subjective, all others negative.    Physical Exam:    GENERAL:  Alert, appears comfortable, in no acute distress, appears stated age   HEAD:  Normocephalic, without obvious abnormality, atraumatic   NECK: Supple, symmetrical, trachea midline   BACK:   Symmetric, no curvature, ROM normal   LUNGS:   Clear to auscultation bilaterally, no rales, rhonchi, or wheezing, symmetric chest rise on inhalation, respirations unlabored   CHEST WALL:  No tenderness or deformity   HEART:  Regular rate and rhythm, S1 and S2 normal, no murmur, rub, or gallop    ABDOMEN:   Soft, non-tender, bowel sounds active all four quadrants, no masses, no organomegaly, no rebound or guarding   EXTREMITIES:  Right hip incision is bandaged with no significant discharge noted. Calves are nontender to palpation no swelling noted. No cyanosis.   SKIN: Dry to touch, no exanthems in the visualized areas   NEURO: Alert, oriented x3, moves all four extremities freely, non-focal   PSYCH: Cooperative, behavior is appropriate      Imaging:  Personally Reviewed.  Results for orders placed or performed during the hospital encounter of 11/03/21   XR Pelvis and Hip  Right 2 Views    Impression    IMPRESSION: Postop right total hip arthroplasty with proximal right femur cerclage wire fixation. No acute displaced periprosthetic fracture identified. Hypertrophic spurring along the superolateral right acetabulum. Expected postop soft tissue gas seen   about the right hip and the anterior right thigh. Left total hip arthroplasty in place. No acute displaced pelvic fracture. Degenerative change both sacroiliac joints and symphysis pubis. Arterial calcification.   XR Femur Port Right 2 Views    Impression    IMPRESSION: Right total hip arthroplasty, and cerclage wires are present in the proximal femur. Normal joint alignment. Hardware is well seated with no evidence of loosening or other complication. No acute fracture visualized in the right hip or right   pelvis. Postoperative in the soft tissues. Normal distal femur and thigh.       Lab Results:  Personally Reviewed.   Recent Labs   Lab 11/05/21  0641 11/04/21  0705 11/03/21  1217   HGB 10.3* 11.1* 13.4     No results for input(s): NA, CO2, BUN, CREATININE, ALBUMIN, BILITOT, ALKPHOS, ALT, AST, GLUCOSE in the last 168 hours.    Invalid input(s): K, CL, CALCIUM, LABALBU, PROT  Recent Labs   Lab 11/03/21  1217   INR 0.93       I reviewed all labs and imaging studies as of this date and I reviewed all current inpatient medications and updated them    Jani Babb DO, MS  [facility]  Internal Medicine

## 2021-11-05 NOTE — PLAN OF CARE
Problem: Pain (Hip Arthroplasty)  Goal: Acceptable Pain Control  Outcome: Declining   Patient more painful this evening and needed IV dilaudid and PRN oxycodone. Patient still having numbness in surgical thigh/hip and knee arash.     Caroline Freire RN 11/4/2021

## 2021-11-05 NOTE — PLAN OF CARE
Problem: Pain (Hip Arthroplasty)  Goal: Acceptable Pain Control  Outcome: Improving     Patient vital signs are at baseline: Yes  Patient able to ambulate as they were prior to admission or with assist devices provided by therapies during their stay:  Yes  Patient MUST void prior to discharge:  Yes  Patient able to tolerate oral intake:  Yes  Pain has adequate pain control using Oral analgesics:  No,  Reason:  IV dilaudid needed today.    Pt pain managed with PRN oxycodone q4h, and was given IV dilaudid x 1 after therapy session. Pt slept after dose completed. Pt leaving to TCU at 1030 tomorrow morning with family to transport.     Michelle Handy RN

## 2021-11-05 NOTE — PLAN OF CARE
Patient vital signs are at baseline: Yes  Patient able to ambulate as they were prior to admission or with assist devices provided by therapies during their stay:  No,  Reason:  Pt's knee still arash during ambulation.   Patient MUST void prior to discharge:  Yes  Patient able to tolerate oral intake:  Yes  Pain has adequate pain control using Oral analgesics:  Yes    Pt still reports numbness to her right thigh which she reports that its improving but her right knee still arash during ambulation. Pain controlled by scheduled Tylenol and prn Oxycodone. Able to make needs known and calls appropriately. Using bedside commode and CPAP was on during this shift. Will continue to monitor.

## 2021-11-05 NOTE — PROGRESS NOTES
Care Management Follow Up    Length of Stay (days): 0    Expected Discharge Date: 11/06/2021     Concerns to be Addressed: Ortho Progression     Patient plan of care discussed at interdisciplinary rounds: Yes    Anticipated Discharge Disposition:  TCU    Anticipated Discharge Services:  TCU Anticipated Discharge DME:  TBD     Patient/family educated on Medicare website which has current facility and service quality ratings:  Yes   Education Provided on the Discharge Plan:  Yes   Patient/Family in Agreement with the Plan:  Yes     Referrals Placed by CM/SW:  TCU's of choice   Private pay costs discussed: N/A    Additional Information:  Pt was accepted at [facility] for 11/6.  SW met with Pt and is accepting for [place].  Daughter will transport at 10:30 am as this is when [facility] would like her to leave the hospital.  KARYN talked with daughter Kaye and verified that she will transport.  No PAS needed for this TCU.       NIESHA Levin

## 2021-11-06 ENCOUNTER — APPOINTMENT (OUTPATIENT)
Dept: RADIOLOGY | Facility: CLINIC | Age: 71
End: 2021-11-06
Attending: PHYSICIAN ASSISTANT
Payer: COMMERCIAL

## 2021-11-06 ENCOUNTER — APPOINTMENT (OUTPATIENT)
Dept: OCCUPATIONAL THERAPY | Facility: CLINIC | Age: 71
End: 2021-11-06
Attending: ORTHOPAEDIC SURGERY
Payer: COMMERCIAL

## 2021-11-06 LAB
GLUCOSE BLDC GLUCOMTR-MCNC: 127 MG/DL (ref 70–99)
GLUCOSE BLDC GLUCOMTR-MCNC: 161 MG/DL (ref 70–99)
GLUCOSE BLDC GLUCOMTR-MCNC: 169 MG/DL (ref 70–99)

## 2021-11-06 PROCEDURE — 82962 GLUCOSE BLOOD TEST: CPT

## 2021-11-06 PROCEDURE — 72170 X-RAY EXAM OF PELVIS: CPT

## 2021-11-06 PROCEDURE — 99232 SBSQ HOSP IP/OBS MODERATE 35: CPT | Performed by: INTERNAL MEDICINE

## 2021-11-06 PROCEDURE — 250N000013 HC RX MED GY IP 250 OP 250 PS 637: Mod: GY | Performed by: INTERNAL MEDICINE

## 2021-11-06 PROCEDURE — 250N000011 HC RX IP 250 OP 636: Performed by: PHYSICIAN ASSISTANT

## 2021-11-06 PROCEDURE — 250N000013 HC RX MED GY IP 250 OP 250 PS 637: Performed by: PHYSICIAN ASSISTANT

## 2021-11-06 PROCEDURE — 97535 SELF CARE MNGMENT TRAINING: CPT | Mod: GO

## 2021-11-06 RX ADMIN — APIXABAN 5 MG: 5 TABLET, FILM COATED ORAL at 21:14

## 2021-11-06 RX ADMIN — ACETAMINOPHEN 975 MG: 325 TABLET ORAL at 15:37

## 2021-11-06 RX ADMIN — DOCUSATE SODIUM 50MG AND SENNOSIDES 8.6MG 1 TABLET: 8.6; 5 TABLET, FILM COATED ORAL at 08:38

## 2021-11-06 RX ADMIN — DULOXETINE HYDROCHLORIDE 30 MG: 30 CAPSULE, DELAYED RELEASE ORAL at 08:38

## 2021-11-06 RX ADMIN — OXYCODONE HYDROCHLORIDE 10 MG: 5 TABLET ORAL at 18:28

## 2021-11-06 RX ADMIN — HYDROMORPHONE HYDROCHLORIDE 0.4 MG: 0.2 INJECTION, SOLUTION INTRAMUSCULAR; INTRAVENOUS; SUBCUTANEOUS at 12:57

## 2021-11-06 RX ADMIN — GABAPENTIN 300 MG: 300 CAPSULE ORAL at 21:14

## 2021-11-06 RX ADMIN — AMLODIPINE BESYLATE 5 MG: 5 TABLET ORAL at 08:37

## 2021-11-06 RX ADMIN — METFORMIN HYDROCHLORIDE 1000 MG: 500 TABLET, FILM COATED ORAL at 18:27

## 2021-11-06 RX ADMIN — APIXABAN 5 MG: 5 TABLET, FILM COATED ORAL at 08:38

## 2021-11-06 RX ADMIN — POLYETHYLENE GLYCOL 3350 17 G: 17 POWDER, FOR SOLUTION ORAL at 08:38

## 2021-11-06 RX ADMIN — SOTALOL HYDROCHLORIDE 80 MG: 80 TABLET ORAL at 08:37

## 2021-11-06 RX ADMIN — ACETAMINOPHEN 975 MG: 325 TABLET ORAL at 06:32

## 2021-11-06 RX ADMIN — OXYCODONE HYDROCHLORIDE 10 MG: 5 TABLET ORAL at 10:40

## 2021-11-06 RX ADMIN — METFORMIN HYDROCHLORIDE 1000 MG: 500 TABLET, FILM COATED ORAL at 08:37

## 2021-11-06 RX ADMIN — SOTALOL HYDROCHLORIDE 80 MG: 80 TABLET ORAL at 21:14

## 2021-11-06 RX ADMIN — NORTRIPTYLINE HYDROCHLORIDE 10 MG: 10 CAPSULE ORAL at 21:14

## 2021-11-06 RX ADMIN — ATORVASTATIN CALCIUM 40 MG: 40 TABLET, FILM COATED ORAL at 21:14

## 2021-11-06 RX ADMIN — OXYCODONE HYDROCHLORIDE 10 MG: 5 TABLET ORAL at 06:32

## 2021-11-06 RX ADMIN — LOSARTAN POTASSIUM 100 MG: 50 TABLET, FILM COATED ORAL at 08:37

## 2021-11-06 RX ADMIN — DOCUSATE SODIUM 50MG AND SENNOSIDES 8.6MG 1 TABLET: 8.6; 5 TABLET, FILM COATED ORAL at 21:14

## 2021-11-06 RX ADMIN — DULOXETINE HYDROCHLORIDE 60 MG: 30 CAPSULE, DELAYED RELEASE ORAL at 21:14

## 2021-11-06 NOTE — PLAN OF CARE
Occupational Therapy Discharge Summary    Reason for therapy discharge:    Discharged to transitional care facility.    Progress towards therapy goal(s). See goals on Care Plan in [facility] electronic health record for goal details.  Goals partially met.  Barriers to achieving goals:   discharge from facility.    Therapy recommendation(s):    Continued therapy is recommended.  Rationale/Recommendations:  to increase ind with ADLS and mobil.

## 2021-11-06 NOTE — PROGRESS NOTES
[facility] Daily Progress Note    Assessment/Plan:  71-year-old female status post right direct anterior total hip arthroplasty on 11/3/2021.     Hypertension, essential   amlodipine 5 mg daily  losartan 100 mg daily     Diabetes mellitus type 2   Metformin 1000 mg p.o. twice daily  NovoLog correction insulin 1 unit per 25 mg greater than 140 mg/dL 4 times daily AC at bedtime     Postoperative anemia secondary to acute blood loss  Hemoglobin stable.   No further workup required.      Supraventricular tachycardia s/p ablation 7/29/21.   History of atrial fibrillation/flutter, tachybrady syndrome.   Status post dual chamber pacemaker 8/23/21.   Continue sotalol 80 mg twice daily  Eliquis 5 mg twice daily per orthopedics.     Chronic obstructive pulmonary disease  Continue albuterol prn.      Hypoxia  Likely residual effects of anesthesia  Continue supplemental oxygen.  Continue continuous pulse oximetry     Moderate obstructive sleep apnea  Continue home cpap     Major depression/anxiety  Continue home cymbalta     Hyperlipidemia  History of TIA  Continue atorvastatin 40 mg nightly    Active Problems:    S/P total hip arthroplasty     LOS: 0 days     Barriers to discharge: patient has concerns about pain control.  Will discuss with orthopedics.   Discharge Disposition: TCU    Subjective:  Ms. Cooper is concerned about postoperative right hip and thigh pain.  She denies calf tenderness. She has no significant swelling.  Denies fever or chills.  No nausea or vomiting.        acetaminophen  975 mg Oral Q8H     amLODIPine  5 mg Oral Daily     apixaban ANTICOAGULANT  5 mg Oral BID     atorvastatin  40 mg Oral At Bedtime     DULoxetine  30 mg Oral Daily     DULoxetine  60 mg Oral QPM     gabapentin  300 mg Oral At Bedtime     insulin aspart  1-10 Units Subcutaneous TID AC     insulin aspart  1-7 Units Subcutaneous At Bedtime     losartan  100 mg Oral Daily     metFORMIN  1,000 mg Oral BID w/meals     nortriptyline  10 mg Oral At  Bedtime     polyethylene glycol  17 g Oral Daily     senna-docusate  1 tablet Oral BID     sodium chloride (PF)  3 mL Intracatheter Q8H     sotalol  80 mg Oral BID       Objective:  Vital signs in last 24 hours:  Temp:  [97.6  F (36.4  C)-100.3  F (37.9  C)] 98.1  F (36.7  C)  Pulse:  [73-85] 80  Resp:  [17-20] 18  BP: (123-148)/(56-67) 134/63  SpO2:  [80 %-94 %] 94 %  Weight:   Weight:   @THISENCWEIGHTS(1)@  Weight change:   Body mass index is 30.67 kg/m .    Intake/Output last 3 shifts:  I/O last 3 completed shifts:  In: 910 [P.O.:910]  Out: 700 [Urine:700]  Intake/Output this shift:  I/O this shift:  In: 120 [P.O.:120]  Out: 400 [Urine:400]    Review of Systems:   As per subjective, all others negative.    Physical Exam:    GENERAL:  Alert, appears comfortable, in no acute distress, appears stated age   HEAD:  Normocephalic, without obvious abnormality, atraumatic   NECK: Supple, symmetrical, trachea midline   BACK:   Symmetric, no curvature, ROM normal   LUNGS:   Clear to auscultation bilaterally, no rales, rhonchi, or wheezing, symmetric chest rise on inhalation, respirations unlabored   CHEST WALL:  No tenderness or deformity   HEART:  Regular rate and rhythm, S1 and S2 normal, no murmur, rub, or gallop    ABDOMEN:   Soft, non-tender, bowel sounds active all four quadrants, no masses, no organomegaly, no rebound or guarding   EXTREMITIES: Extremities normal, atraumatic, no cyanosis or edema    SKIN: Dry to touch, no exanthems in the visualized areas   NEURO: Alert, oriented x3, moves all four extremities freely, non-focal   PSYCH: Cooperative, behavior is appropriate      Imaging:  Personally Reviewed.  Results for orders placed or performed during the hospital encounter of 11/03/21   XR Pelvis and Hip Right 2 Views    Impression    IMPRESSION: Postop right total hip arthroplasty with proximal right femur cerclage wire fixation. No acute displaced periprosthetic fracture identified. Hypertrophic spurring along  the superolateral right acetabulum. Expected postop soft tissue gas seen   about the right hip and the anterior right thigh. Left total hip arthroplasty in place. No acute displaced pelvic fracture. Degenerative change both sacroiliac joints and symphysis pubis. Arterial calcification.   XR Femur Port Right 2 Views    Impression    IMPRESSION: Right total hip arthroplasty, and cerclage wires are present in the proximal femur. Normal joint alignment. Hardware is well seated with no evidence of loosening or other complication. No acute fracture visualized in the right hip or right   pelvis. Postoperative in the soft tissues. Normal distal femur and thigh.       Lab Results:  Personally Reviewed.   Recent Labs   Lab 11/05/21  0641 11/04/21  0705 11/03/21  1217   HGB 10.3* 11.1* 13.4     No results for input(s): NA, CO2, BUN, CREATININE, ALBUMIN, BILITOT, ALKPHOS, ALT, AST, GLUCOSE in the last 168 hours.    Invalid input(s): K, CL, CALCIUM, LABALBU, PROT  Recent Labs   Lab 11/03/21  1217   INR 0.93       I reviewed all labs and imaging studies as of this date and I reviewed all current inpatient medications and updated them    Jani Babb DO, MS  [facility]  Internal Medicine

## 2021-11-06 NOTE — DISCHARGE SUMMARY
Orthopedic Discharge Summary   Patient: Preeti Cooper  Admission Date: 11/3/2021  Discharge Date: 11/7/2021   Date of Service: 11/7/2021  Attending Provider: Neftali Brown MD  Admission Diagnosis: Right hip pain [M25.551]  S/P total hip arthroplasty [Z96.649]  Discharge Diagnosis: same  Procedures Performed: R direct anterior LANIE  Complications: Post-op RLE numbness and weakness  History of Present Illness: Preeti Cooper is a pleasant 71 year old female with long standing Hip degenerative joint disease.  It failed to respond to conservative management.  The above procedure was recommended.  For full details please see my clinic notes.  The risks including, but not limited to, bleeding, infection, nerve injury, dvt, implant failure, implant wear, fracture, limb length inequality, anesthetic complications, heart attack, stroke, and even death were discussed.  Pt verbalized understanding.  Informed consent was obtained  Past Medical History:   Past Medical History:   Diagnosis Date     Antiplatelet or antithrombotic long-term use      Arrhythmia      Arthritis      COPD (chronic obstructive pulmonary disease) (H)      Coronary artery disease      Diabetes (H)      Hypertension      Irregular heart beat      Obese      Other chronic pain      Pacemaker      Sleep apnea      TIA (transient ischemic attack) 2005     Patient Active Problem List   Diagnosis     S/P total hip arthroplasty     Past Surgical History:   Procedure Laterality Date     ARTHROPLASTY, HIP, TOTAL, DIRECT ANTERIOR APPROACH, USING HANA TABLE Right 11/3/2021    Procedure: RIGHT DIRECT ANTERIOR TOTAL HIP ARTHROPLASTY;  Surgeon: Neftali Brown MD;  Location: [facility] Main OR     ATRIAL CARDIAC PACEMAKER INSERTION  2021     CARDIAC SURGERY  2021    ablation     ORTHOPEDIC SURGERY      LANIE     Social History     Socioeconomic History     Marital status:      Spouse name: Not on file     Number of children: Not on file     Years of education:  Not on file     Highest education level: Not on file   Occupational History     Not on file   Tobacco Use     Smoking status: Never Smoker     Smokeless tobacco: Never Used   Substance and Sexual Activity     Alcohol use: Not Currently     Comment: rare     Drug use: Never     Sexual activity: Not on file   Other Topics Concern     Not on file   Social History Narrative     Not on file     Social Determinants of Health     Financial Resource Strain:      Difficulty of Paying Living Expenses:    Food Insecurity:      Worried About Running Out of Food in the Last Year:      Ran Out of Food in the Last Year:    Transportation Needs:      Lack of Transportation (Medical):      Lack of Transportation (Non-Medical):    Physical Activity:      Days of Exercise per Week:      Minutes of Exercise per Session:    Stress:      Feeling of Stress :    Social Connections:      Frequency of Communication with Friends and Family:      Frequency of Social Gatherings with Friends and Family:      Attends [employer] Services:      Active Member of Clubs or Organizations:      Attends Club or Organization Meetings:      Marital Status:    Intimate Partner Violence:      Fear of Current or Ex-Partner:      Emotionally Abused:      Physically Abused:      Sexually Abused:      Medications on admission:   Medications Prior to Admission   Medication Sig Dispense Refill Last Dose     albuterol (PROAIR HFA/PROVENTIL HFA/VENTOLIN HFA) 108 (90 Base) MCG/ACT inhaler Inhale 1-2 puffs into the lungs every 4 hours as needed    More than a month at Unknown time     amLODIPine (NORVASC) 5 MG tablet Take 5 mg by mouth daily   11/3/2021 at Unknown time     apixaban ANTICOAGULANT (ELIQUIS) 5 MG tablet Take 5 mg by mouth 2 times daily   10/31/2021 at 2200     atorvastatin (LIPITOR) 40 MG tablet Take 40 mg by mouth At Bedtime    11/2/2021 at Unknown time     DULoxetine (CYMBALTA) 30 MG capsule Take 30 mg by mouth daily 30MG IN THE MORNING AND 60MG IN THE  EVENING   11/2/2021 at Unknown time     DULoxetine (CYMBALTA) 60 MG capsule Take 60 mg by mouth every evening 30MG IN THE MORNING AND 60MG IN THE EVENING   11/2/2021 at Unknown time     losartan (COZAAR) 100 MG tablet Take 100 mg by mouth daily   11/3/2021 at Unknown time     melatonin 5 MG tablet Take 5 mg by mouth At Bedtime   11/2/2021 at Unknown time     metFORMIN (GLUCOPHAGE) 500 MG tablet Take 1,000 mg by mouth 2 times daily (with meals)    11/1/2021     nortriptyline (PAMELOR) 10 MG capsule Take 10 mg by mouth At Bedtime   11/1/2021     sotalol (BETAPACE) 80 MG tablet Take 80 mg by mouth 2 times daily   11/3/2021 at 0900     vitamin D3 (CHOLECALCIFEROL) 50 mcg (2000 units) tablet Take 1 tablet by mouth daily   11/1/2021     [DISCONTINUED] traMADol (ULTRAM) 50 MG tablet Take  mg by mouth every 4 hours as needed for severe pain         Allergies:    Allergies   Allergen Reactions     Atenolol      Sulfa Drugs      Lamotrigine Rash       Hospital Course: Patient was brought to the OR on 11/3/21 where she underwent the above named procedure. Post-op, she had significant issues with R leg numbness and weakness. Ancef was given for 24 hours after surgery. She was given Aspirin twice daily for DVT prophylaxis and her pain was well controlled with Dilaudid, then transitioned to oral pain medications during her hospital stay. She progressed well with physical therapy and discharge to TCU was recommended.No new medical problems presented during her hospital stay.   Consultations Obtained During Hospitalization:  1. Physical Therapy for gait training, mobilization, range of motion and strengthening exercises  2. Occupational Therapy for activities of daily living.  3. Social Work for disposition planning.  Active Problems:    S/P total hip arthroplasty    Discharge Disposition: TCU  Discharge Diet: regular  Discharge Medications:   Current Discharge Medication List      START taking these medications    Details    acetaminophen (TYLENOL) 325 MG tablet Take 2 tablets (650 mg) by mouth every 4 hours as needed for other (mild pain)  Qty: 100 tablet, Refills: 0    Associated Diagnoses: Status post total replacement of right hip      gabapentin (NEURONTIN) 300 MG capsule Take 1 capsule (300 mg) by mouth At Bedtime  Qty: 30 capsule, Refills: 0    Associated Diagnoses: Status post total replacement of right hip      hydrOXYzine (ATARAX) 10 MG tablet Take 1 tablet (10 mg) by mouth every 6 hours as needed for itching or anxiety (with pain, moderate pain; pain med enhancer)  Qty: 30 tablet, Refills: 0    Associated Diagnoses: Status post total replacement of right hip      oxyCODONE (ROXICODONE) 5 MG tablet Take 1-2 tablets (5-10 mg) by mouth every 4 hours as needed for pain (Moderate to Severe, MDD 6 tabs) Take 1 tab for pain 1-6/10 take 2 tabs for pain 7-10/10.  Qty: 28 tablet, Refills: 0    Associated Diagnoses: Status post total replacement of right hip      senna-docusate (SENOKOT-S/PERICOLACE) 8.6-50 MG tablet Take 1 tablet by mouth 2 times daily Take while on oral narcotics to prevent or treat constipation.  Qty: 40 tablet, Refills: 0    Associated Diagnoses: Status post total replacement of right hip         CONTINUE these medications which have NOT CHANGED    Details   albuterol (PROAIR HFA/PROVENTIL HFA/VENTOLIN HFA) 108 (90 Base) MCG/ACT inhaler Inhale 1-2 puffs into the lungs every 4 hours as needed       amLODIPine (NORVASC) 5 MG tablet Take 5 mg by mouth daily      apixaban ANTICOAGULANT (ELIQUIS) 5 MG tablet Take 5 mg by mouth 2 times daily      atorvastatin (LIPITOR) 40 MG tablet Take 40 mg by mouth At Bedtime       !! DULoxetine (CYMBALTA) 30 MG capsule Take 30 mg by mouth daily 30MG IN THE MORNING AND 60MG IN THE EVENING      !! DULoxetine (CYMBALTA) 60 MG capsule Take 60 mg by mouth every evening 30MG IN THE MORNING AND 60MG IN THE EVENING      losartan (COZAAR) 100 MG tablet Take 100 mg by mouth daily       melatonin 5 MG tablet Take 5 mg by mouth At Bedtime      metFORMIN (GLUCOPHAGE) 500 MG tablet Take 1,000 mg by mouth 2 times daily (with meals)       nortriptyline (PAMELOR) 10 MG capsule Take 10 mg by mouth At Bedtime      sotalol (BETAPACE) 80 MG tablet Take 80 mg by mouth 2 times daily      vitamin D3 (CHOLECALCIFEROL) 50 mcg (2000 units) tablet Take 1 tablet by mouth daily       !! - Potential duplicate medications found. Please discuss with provider.      STOP taking these medications       traMADol (ULTRAM) 50 MG tablet Comments:   Reason for Stopping:             Code Status: full  Jani Goldberg PA-C  11/6/2021 8:08 AM   [facility]  [phone] office

## 2021-11-06 NOTE — PROGRESS NOTES
Orthopedic Progress Note  Preeti Cooper  71 year old female  Subjective: POD #3 s/p R DA hip, going to TCU today. Improving but veryslowly. Denies CP/SOB/N/V. No calf pain.  Vitals:    11/05/21 2345 11/05/21 2351 11/06/21 0248 11/06/21 0632   BP:   123/56    BP Location:   Right arm    Pulse:   73    Resp:   18    Temp:   98.2  F (36.8  C)    TempSrc:   Oral    SpO2: (!) 89% 91% 91% 90%   Weight:       Height:         O: 71 year old, female in NAD, A&Ox3, resting comfortably on bed.  Resp: Breathing regular and non-labored.  Incision: Dressing c/d/i, no erythema, warmth or drainage.  Extremities: Unable to actively SLR. SILT DP/SP/plantar n dist. Motor intact EHL/FHL/TA/GSC. Calf soft and non-tender. Leg warm and well perfused    Hemoglobin   Date Value Ref Range Status   11/05/2021 10.3 (L) 11.7 - 15.7 g/dL Final   ]    A/P: 71 year old female POD#3 s/p R DA LANIE  -WBAT  -Pain management  -ASA /mechanical DVT prophylaxis  -No dressing change   -Ice/elevate  -Cont PT/OT  -SW for dispo planning - TCU today  -F/U with Dr. Brown in 10-14 days  Jani Goldberg PA-C  11/6/2021 8:03 AM   [facility]  [phone] office

## 2021-11-06 NOTE — PLAN OF CARE
Patient vital signs are at baseline: No,  pt requiring 5L bled through her CPAP to keep POX above 90%  Patient able to ambulate as they were prior to admission or with assist devices provided by therapies during their stay:  Yes  Patient MUST void prior to discharge:  Yes  Patient able to tolerate oral intake:  Yes  Pain has adequate pain control using Oral analgesics:  No,  pt requested PRN IV Dilaudid for severe breakthrough pain at 1953.     Pt reported 7/10 pain to her right hip at the start of my shift at 1900. Explained that she still had two hours until she could get her oxycodone therefore she requested IV dilaudid. Pt rested after the dilaudid was given. Pt woke up to use the bathroom at around 2200, pt could barely move to get out of bed r/t the pain. Needed A2 to move her body to sit at the side of the bed. Pt was able to get oxy 10mg again at 2245, pt was alert and oriented when the meds were given. 2305-Change of shift vitals showed POX 80% on RA, pt is sleepy but awake and speaking to the writer. Put on 4L of O2 via NC and she was sating at 91%, again still sleepy but easily awakened. Asked RT to bleed O2 through the her CPAP tonight. Pt sleeps in between cares. 2351-Bumped O2 up to 5L, she was sating 89% on 4L. Pt now awakens spontaneously and denies pain.     Plan in place to discharge to [facility] at 10:30. Family to transport.

## 2021-11-06 NOTE — PLAN OF CARE
Patient vital signs are at baseline: Yes - pt baseline DONOVAN with CPAP, needed O2 2L when cpap was taken off per pt preference  Patient able to ambulate as they were prior to admission or with assist devices provided by therapies during their stay:  Yes  Patient MUST void prior to discharge:  Yes  Patient able to tolerate oral intake:  Yes  Pain has adequate pain control using Oral analgesics:  Yes     Pt is more alert overnight. Able to take off the 5L flow O2. O2 sats 90-92% on RA. IS encouraged and performed. O2 2L put on while resting per pt preference as opposed to CPAP. Pt able to tolerate the transfer to the commode better this shift. There has been little pain, right hip just feels swollen, hard and stiff - making it hard for the pt to move the leg in order to get it out of bed. A2 to get out of the bed but is able to pivot with less assistance. Pt did not give her pain high levels this shift, held off on narcotics for now.     Plan for Count includes the [facility] TCU today. Ride per family at 10:30.

## 2021-11-06 NOTE — PROGRESS NOTES
Care Management Discharge Note    Discharge Date: 11/06/2021  Expected Time of Departure: 10:30 AM    Discharge Disposition: Transitional Care    Discharge Services: None    Discharge DME: None    Discharge Transportation: family or friend will provide    Private pay costs discussed: Not applicable    PAS Confirmation Code:  (Not need for this TCU )  Patient/family educated on Medicare website which has current facility and service quality ratings: yes    Education Provided on the Discharge Plan:  yes  Persons Notified of Discharge Plans: facility  Patient/Family in Agreement with the Plan: yes    Handoff Referral Completed: Yes    Additional Information:  LAURIE spoke with [facility] who confirmed they are expecting the pt today with a ride time of 10:30.       Monica Adorno [facility]    Addendum: LAURIE informed that pt now requesting wheelchair transport. LAURIE met with pt and daughter to discuss. Pt tearful about her current mobility status and does state she does not feel she can transport in the car. Laurie informed her of potential private pay cost for transport. Pt reports understanding. Pt also requesting to speak with orthopedics. LAURIE updated RN on this request. LAURIE set up wheelchair transport at 12:00. LAURIE updated facility on the ride time.  11:01 AM    LAURIE informed that pt now needing an x-ray and will be unable to discharge at 12. LAURIE changed ride through [facility] transport to 3:00. LAURIE updated facility via VM on the admissions line and speaking with the main desk staff. LAURIE updated pts RN.  11:44 AM    LAURIE updated by admissions at [facility] that they cannot accept pt with the 3:00 ride and would not able to accept her after 12:30 due to staffing for admissions. They report they can accept pt tomorrow morning with a 10:30 transport. LAURIE moved ride to 10:30 on 11/7. LAURIE updated RN, pt and daughter.  12:27 PM

## 2021-11-07 ENCOUNTER — APPOINTMENT (OUTPATIENT)
Dept: PHYSICAL THERAPY | Facility: CLINIC | Age: 71
End: 2021-11-07
Attending: ORTHOPAEDIC SURGERY
Payer: COMMERCIAL

## 2021-11-07 VITALS
HEART RATE: 81 BPM | WEIGHT: 195.8 LBS | DIASTOLIC BLOOD PRESSURE: 65 MMHG | BODY MASS INDEX: 30.73 KG/M2 | RESPIRATION RATE: 16 BRPM | TEMPERATURE: 97.7 F | HEIGHT: 67 IN | OXYGEN SATURATION: 91 % | SYSTOLIC BLOOD PRESSURE: 137 MMHG

## 2021-11-07 LAB — GLUCOSE BLDC GLUCOMTR-MCNC: 147 MG/DL (ref 70–99)

## 2021-11-07 PROCEDURE — 97112 NEUROMUSCULAR REEDUCATION: CPT | Mod: GP | Performed by: PHYSICAL THERAPIST

## 2021-11-07 PROCEDURE — 250N000013 HC RX MED GY IP 250 OP 250 PS 637: Mod: GY | Performed by: INTERNAL MEDICINE

## 2021-11-07 PROCEDURE — 250N000013 HC RX MED GY IP 250 OP 250 PS 637: Performed by: PHYSICIAN ASSISTANT

## 2021-11-07 PROCEDURE — 82962 GLUCOSE BLOOD TEST: CPT

## 2021-11-07 PROCEDURE — 97530 THERAPEUTIC ACTIVITIES: CPT | Mod: GP | Performed by: PHYSICAL THERAPIST

## 2021-11-07 PROCEDURE — 97116 GAIT TRAINING THERAPY: CPT | Mod: GP | Performed by: PHYSICAL THERAPIST

## 2021-11-07 RX ADMIN — POLYETHYLENE GLYCOL 3350 17 G: 17 POWDER, FOR SOLUTION ORAL at 08:24

## 2021-11-07 RX ADMIN — APIXABAN 5 MG: 5 TABLET, FILM COATED ORAL at 08:25

## 2021-11-07 RX ADMIN — LOSARTAN POTASSIUM 100 MG: 50 TABLET, FILM COATED ORAL at 08:24

## 2021-11-07 RX ADMIN — METFORMIN HYDROCHLORIDE 1000 MG: 500 TABLET, FILM COATED ORAL at 08:24

## 2021-11-07 RX ADMIN — DULOXETINE HYDROCHLORIDE 30 MG: 30 CAPSULE, DELAYED RELEASE ORAL at 08:24

## 2021-11-07 RX ADMIN — SOTALOL HYDROCHLORIDE 80 MG: 80 TABLET ORAL at 08:24

## 2021-11-07 RX ADMIN — OXYCODONE HYDROCHLORIDE 10 MG: 5 TABLET ORAL at 01:40

## 2021-11-07 RX ADMIN — AMLODIPINE BESYLATE 5 MG: 5 TABLET ORAL at 08:25

## 2021-11-07 RX ADMIN — OXYCODONE HYDROCHLORIDE 10 MG: 5 TABLET ORAL at 10:01

## 2021-11-07 RX ADMIN — DOCUSATE SODIUM 50MG AND SENNOSIDES 8.6MG 1 TABLET: 8.6; 5 TABLET, FILM COATED ORAL at 08:24

## 2021-11-07 NOTE — PROGRESS NOTES
Patient discharged to TCU via wheelchair transport. Discharge instructions reviewed and signed by the patient. All personal belongings removed from the room.  Giovanni Mehta RN  11/7/2021  10:35 AM

## 2021-11-07 NOTE — PLAN OF CARE
Problem: Pain (Hip Arthroplasty)  Goal: Acceptable Pain Control  Outcome: No Change     Pt discharged post poned due to timing of xray. Xray shows well healing hip replacement. No new concerns. Pt will be leaving at 1030 with wheelchair transport tomorrow morning.     IV dilaudid used x 1

## 2021-11-07 NOTE — PLAN OF CARE
Patient vital signs are at baseline: Yes and no. During the day she is able to maintain SPO2 above 90% but at night after pain medications and sleeping she has needed 1.5 of supplemental oxygen bled through the CPAP. Hx of DONOVAN. Trialing CPAP alone again this morning.   Patient able to ambulate as they were prior to admission or with assist devices provided by therapies during their stay:  Yes  Patient MUST void prior to discharge:  Yes  Patient able to tolerate oral intake:  Yes  Pain has adequate pain control using Oral analgesics:  Yes     Plan to discharge to [facility] TCU today if stable. Family to transport at 10:30.

## 2021-11-07 NOTE — PLAN OF CARE
Patient vital signs are at baseline: yes  Patient able to ambulate as they were prior to admission or with assist devices provided by therapies during their stay:  yes  Patient MUST void prior to discharge: yes  Patient able to tolerate oral intake:  yes  Pain has adequate pain control using Oral analgesics:  yes  Giovanni Mehta RN  11/7/2021  10:16 AM

## 2021-11-07 NOTE — PLAN OF CARE
Physical Therapy Discharge Summary    Reason for therapy discharge:    Discharged to transitional care facility.    Progress towards therapy goal(s). See goals on Care Plan in [facility] electronic health record for goal details.  Goals partially met.  Barriers to achieving goals:   limited tolerance for therapy and discharge from facility.    Therapy recommendation(s):    Continued therapy is recommended.  Rationale/Recommendations:  Reclaim functional mobility.

## 2021-11-07 NOTE — PLAN OF CARE
Patient vital signs are at baseline: Yes  Patient able to ambulate as they were prior to admission or with assist devices provided by therapies during their stay:  No,  Reason:  A1, GB, W-needs encouragement  Patient MUST void prior to discharge:  Yes  Patient able to tolerate oral intake:  Yes  Pain has adequate pain control using Oral analgesics:  Yes     Pt is A&Ox4. CPAP at bedtime. Pain managed with PRN oxycodone. PIV SL. Up A1, GB/W-needs encouragement to ambulate. Voiding adequately. Had large BM this AM. BG checks: 161. R hip dressing CDI. Plan to discharge to TCU tomorrow at 10:30 via wheelchair transport.

## 2021-11-07 NOTE — PROGRESS NOTES
Orthopedic Progress Note  Preeti Cooper  71 year old female  Subjective: POD #4 s/p DA R hip, discharge yesterday held due to pain and inability to go in a car. Planning on dischrage to TCU today, though clearly is frustrated by lack of improvement. Denies CP/new SOB/N/V/calf pain.  Vitals:    11/07/21 0543 11/07/21 0546 11/07/21 0614 11/07/21 0749   BP:    137/65   BP Location:    Right leg   Patient Position:       Pulse:    81   Resp:    16   Temp:    97.7  F (36.5  C)   TempSrc:    Oral   SpO2: (!) 87% 91% 90% 91%   Weight:       Height:         O: 71 year old, female in NAD, A&Ox3, resting comfortably on bed.  Resp: Breathing regular and non-labored.  Incision: dressing c/d/i, no erythema, warmth or drainage.  Extremities: SILT RLE. Motor intact EHL/FHL/TA/GSC. Calf soft and non-tender. Leg warm and well perfused    Hemoglobin   Date Value Ref Range Status   11/05/2021 10.3 (L) 11.7 - 15.7 g/dL Final   ]    AP pelvis from 11/6/21 reviewed does not show any change in her hip alignment. Circlage wire is intact.    A/P: 71 year old female POD#4 s/p DA R LANIE  -WBAT  -Pain management  -ASA /mechanical DVT prophylaxis  -No dressing change   -Ice/elevate  -Cont PT/OT  -Medicine following - comgmt appreciated  -SW for dispo planning - TCU today  -F/U with Dr. Mario in 10-14 days  Jani Goldberg PA-C  11/7/2021 8:48 AM   [facility]  [phone] office

## 2021-11-07 NOTE — PROGRESS NOTES
Care Management Discharge Note    Discharge Date: 11/07/2021  Expected Time of Departure: 10:30 AM    Discharge Disposition: Transitional Care    Discharge Services: None    Discharge DME: None    Discharge Transportation: family or friend will provide    Private pay costs discussed: transportation costs    PAS Confirmation Code:  (Not need for this TCU )  Patient/family educated on Medicare website which has current facility and service quality ratings: yes    Persons Notified of Discharge Plans: facility, [facility] [place], RN  Patient/Family in Agreement with the Plan: yes    Handoff Referral Completed: Yes    Additional Information:  KARYN confirmed pts wheelchair transport at 10:30 AM. KARYN left  for [facility] to confirm pts planned admission there today with the ride time. KARYN did confirm this time with admissions yesterday.       BRET Bundy    Addendum: KARYN received call back from [facility] reporting that they received orders. KARYN informed them of 10:30 ride time and they reported they are expecting pt this morning.  9:35 AM

## 2022-05-29 ENCOUNTER — HEALTH MAINTENANCE LETTER (OUTPATIENT)
Age: 72
End: 2022-05-29

## 2022-10-03 ENCOUNTER — HEALTH MAINTENANCE LETTER (OUTPATIENT)
Age: 72
End: 2022-10-03

## 2023-06-04 ENCOUNTER — HEALTH MAINTENANCE LETTER (OUTPATIENT)
Age: 73
End: 2023-06-04

## 2023-10-21 ENCOUNTER — HEALTH MAINTENANCE LETTER (OUTPATIENT)
Age: 73
End: 2023-10-21

## 2024-10-05 ENCOUNTER — HEALTH MAINTENANCE LETTER (OUTPATIENT)
Age: 74
End: 2024-10-05

## (undated) DEVICE — GOWN IMPERVIOUS BREATHABLE 2XL/XLONG

## (undated) DEVICE — SU MONOCRYL 3-0 PS-2 18" UND MCP497G

## (undated) DEVICE — HOOD FLYTE SURGICOOL WITH PEEL AWAY

## (undated) DEVICE — BONE CLEANING TIP INTERPULSE  0210-010-000

## (undated) DEVICE — GLOVE UNDER INDICATOR PI SZ 8.5 LF 41685

## (undated) DEVICE — DRESSING MEPILEX BORDER POST-OP 4X10

## (undated) DEVICE — Device

## (undated) DEVICE — GLOVE BIOGEL PI ULTRATOUCH G SZ 7.5 42175

## (undated) DEVICE — KIT PATIENT CARE HANA TABLE PROFX SUPINE 6855

## (undated) DEVICE — DRAPE IOBAN ISOLATION VERTICAL 320X21CM 6617

## (undated) DEVICE — GLOVE BIOGEL INDICATOR 7.5 LF 41675

## (undated) DEVICE — SU STRATAFIX PDS PLUS 1 CT-1 18" SXPP1A404

## (undated) DEVICE — GLOVE BIOGEL PI SZ 8.5 40885

## (undated) DEVICE — SUTURE VICRYL+ 2-0 27IN CT-1 UND VCP259H

## (undated) DEVICE — PADDING CAST 4IN WEBRIL STRL 2502

## (undated) DEVICE — DRAPE SHEET REV FOLD 3/4 9349

## (undated) DEVICE — SYR BULB IRRIG 50ML LATEX FREE 0035280

## (undated) DEVICE — DRAPE STERI TOWEL LG 1010

## (undated) DEVICE — SUCTION IRR SYSTEM W/O TIP INTERPULSE HANDPIECE 0210-100-000

## (undated) DEVICE — DECANTER VIAL 2006S

## (undated) DEVICE — SU FIBERWIRE 2 38" T-8 NDL  AR-7206

## (undated) DEVICE — SUTURE VICRYL+ 1 27IN CT-1 UND VCP261H

## (undated) DEVICE — BLADE PRECISION 25X1.27X9 6725127090

## (undated) DEVICE — SUCTION MANIFOLD NEPTUNE 2 SYS 4 PORT 0702-020-000

## (undated) DEVICE — SOL WATER IRRIG 1000ML BOTTLE 2F7114

## (undated) DEVICE — SOL NACL 0.9% IRRIG 1000ML BOTTLE 2F7124

## (undated) DEVICE — SU ETHIBOND 5 LRDA 30" B499T

## (undated) DEVICE — ADH SKIN CLOSURE PREMIERPRO EXOFIN 1.0ML 3470

## (undated) DEVICE — CUSTOM PACK ANTERIOR HIP SOP5BAHHED

## (undated) DEVICE — PLATE GROUNDING ADULT W/CORD 9165L

## (undated) DEVICE — NEEDLE SPINAL DISP 18X3 QUINCKE BD 5174

## (undated) DEVICE — SOL NACL 0.9% INJ 1000ML BAG 2B1324X